# Patient Record
Sex: MALE | Race: WHITE | NOT HISPANIC OR LATINO | Employment: UNEMPLOYED | URBAN - NONMETROPOLITAN AREA
[De-identification: names, ages, dates, MRNs, and addresses within clinical notes are randomized per-mention and may not be internally consistent; named-entity substitution may affect disease eponyms.]

---

## 2021-08-05 ENCOUNTER — APPOINTMENT (OUTPATIENT)
Dept: CT IMAGING | Facility: HOSPITAL | Age: 51
End: 2021-08-05

## 2021-08-05 ENCOUNTER — HOSPITAL ENCOUNTER (EMERGENCY)
Facility: HOSPITAL | Age: 51
Discharge: HOME | End: 2021-08-05
Attending: STUDENT IN AN ORGANIZED HEALTH CARE EDUCATION/TRAINING PROGRAM | Admitting: STUDENT IN AN ORGANIZED HEALTH CARE EDUCATION/TRAINING PROGRAM

## 2021-08-05 ENCOUNTER — APPOINTMENT (OUTPATIENT)
Dept: GENERAL RADIOLOGY | Facility: HOSPITAL | Age: 51
End: 2021-08-05

## 2021-08-05 VITALS
TEMPERATURE: 98.2 F | HEART RATE: 114 BPM | HEIGHT: 71 IN | OXYGEN SATURATION: 95 % | BODY MASS INDEX: 21.56 KG/M2 | DIASTOLIC BLOOD PRESSURE: 98 MMHG | RESPIRATION RATE: 16 BRPM | SYSTOLIC BLOOD PRESSURE: 163 MMHG | WEIGHT: 154 LBS

## 2021-08-05 DIAGNOSIS — F10.10 ALCOHOL ABUSE: ICD-10-CM

## 2021-08-05 DIAGNOSIS — T14.8XXA AVULSION FRACTURE: Primary | ICD-10-CM

## 2021-08-05 DIAGNOSIS — S42.291A CLOSED FRACTURE OF HEAD OF RIGHT HUMERUS, INITIAL ENCOUNTER: ICD-10-CM

## 2021-08-05 DIAGNOSIS — R55 NEAR SYNCOPE: ICD-10-CM

## 2021-08-05 LAB
ALBUMIN SERPL-MCNC: 3.9 G/DL (ref 3.5–5.2)
ALBUMIN/GLOB SERPL: 1.3 G/DL
ALP SERPL-CCNC: 118 U/L (ref 39–117)
ALT SERPL W P-5'-P-CCNC: 99 U/L (ref 1–41)
ANION GAP SERPL CALCULATED.3IONS-SCNC: 17 MMOL/L (ref 5–15)
AST SERPL-CCNC: 177 U/L (ref 1–40)
BASOPHILS # BLD AUTO: 0.06 10*3/MM3 (ref 0–0.2)
BASOPHILS NFR BLD AUTO: 0.5 % (ref 0–1.5)
BILIRUB SERPL-MCNC: 1.8 MG/DL (ref 0–1.2)
BUN SERPL-MCNC: 5 MG/DL (ref 6–20)
BUN/CREAT SERPL: 7.7 (ref 7–25)
CALCIUM SPEC-SCNC: 9.9 MG/DL (ref 8.6–10.5)
CHLORIDE SERPL-SCNC: 87 MMOL/L (ref 98–107)
CO2 SERPL-SCNC: 26 MMOL/L (ref 22–29)
CREAT SERPL-MCNC: 0.65 MG/DL (ref 0.76–1.27)
DEPRECATED RDW RBC AUTO: 52.1 FL (ref 37–54)
EOSINOPHIL # BLD AUTO: 0.05 10*3/MM3 (ref 0–0.4)
EOSINOPHIL NFR BLD AUTO: 0.4 % (ref 0.3–6.2)
ERYTHROCYTE [DISTWIDTH] IN BLOOD BY AUTOMATED COUNT: 15 % (ref 12.3–15.4)
ETHANOL BLD-MCNC: 220 MG/DL (ref 0–10)
ETHANOL UR QL: 0.22 %
GFR SERPL CREATININE-BSD FRML MDRD: 131 ML/MIN/1.73
GLOBULIN UR ELPH-MCNC: 3 GM/DL
GLUCOSE SERPL-MCNC: 106 MG/DL (ref 65–99)
HCT VFR BLD AUTO: 47.6 % (ref 37.5–51)
HGB BLD-MCNC: 17 G/DL (ref 13–17.7)
HOLD SPECIMEN: NORMAL
IMM GRANULOCYTES # BLD AUTO: 0.08 10*3/MM3 (ref 0–0.05)
IMM GRANULOCYTES NFR BLD AUTO: 0.7 % (ref 0–0.5)
LYMPHOCYTES # BLD AUTO: 2.85 10*3/MM3 (ref 0.7–3.1)
LYMPHOCYTES NFR BLD AUTO: 23.4 % (ref 19.6–45.3)
MAGNESIUM SERPL-MCNC: 1.5 MG/DL (ref 1.6–2.6)
MCH RBC QN AUTO: 33.9 PG (ref 26.6–33)
MCHC RBC AUTO-ENTMCNC: 35.7 G/DL (ref 31.5–35.7)
MCV RBC AUTO: 94.8 FL (ref 79–97)
MONOCYTES # BLD AUTO: 1.24 10*3/MM3 (ref 0.1–0.9)
MONOCYTES NFR BLD AUTO: 10.2 % (ref 5–12)
NEUTROPHILS NFR BLD AUTO: 64.8 % (ref 42.7–76)
NEUTROPHILS NFR BLD AUTO: 7.89 10*3/MM3 (ref 1.7–7)
NRBC BLD AUTO-RTO: 0 /100 WBC (ref 0–0.2)
PLATELET # BLD AUTO: 76 10*3/MM3 (ref 140–450)
PMV BLD AUTO: 10.1 FL (ref 6–12)
POTASSIUM SERPL-SCNC: 2.9 MMOL/L (ref 3.5–5.2)
PROT SERPL-MCNC: 6.9 G/DL (ref 6–8.5)
RBC # BLD AUTO: 5.02 10*6/MM3 (ref 4.14–5.8)
SODIUM SERPL-SCNC: 130 MMOL/L (ref 136–145)
WBC # BLD AUTO: 12.17 10*3/MM3 (ref 3.4–10.8)

## 2021-08-05 PROCEDURE — 96368 THER/DIAG CONCURRENT INF: CPT

## 2021-08-05 PROCEDURE — 82077 ASSAY SPEC XCP UR&BREATH IA: CPT | Performed by: NURSE PRACTITIONER

## 2021-08-05 PROCEDURE — 99284 EMERGENCY DEPT VISIT MOD MDM: CPT

## 2021-08-05 PROCEDURE — 93010 ELECTROCARDIOGRAM REPORT: CPT | Performed by: INTERNAL MEDICINE

## 2021-08-05 PROCEDURE — 93005 ELECTROCARDIOGRAM TRACING: CPT | Performed by: NURSE PRACTITIONER

## 2021-08-05 PROCEDURE — 25010000002 MAGNESIUM SULFATE 2 GM/50ML SOLUTION: Performed by: NURSE PRACTITIONER

## 2021-08-05 PROCEDURE — 0 IOPAMIDOL PER 1 ML: Performed by: STUDENT IN AN ORGANIZED HEALTH CARE EDUCATION/TRAINING PROGRAM

## 2021-08-05 PROCEDURE — 70450 CT HEAD/BRAIN W/O DYE: CPT

## 2021-08-05 PROCEDURE — 96366 THER/PROPH/DIAG IV INF ADDON: CPT

## 2021-08-05 PROCEDURE — 25010000003 POTASSIUM CHLORIDE 10 MEQ/100ML SOLUTION: Performed by: NURSE PRACTITIONER

## 2021-08-05 PROCEDURE — 80053 COMPREHEN METABOLIC PANEL: CPT | Performed by: NURSE PRACTITIONER

## 2021-08-05 PROCEDURE — 70498 CT ANGIOGRAPHY NECK: CPT

## 2021-08-05 PROCEDURE — 85025 COMPLETE CBC W/AUTO DIFF WBC: CPT | Performed by: NURSE PRACTITIONER

## 2021-08-05 PROCEDURE — 73030 X-RAY EXAM OF SHOULDER: CPT

## 2021-08-05 PROCEDURE — 96365 THER/PROPH/DIAG IV INF INIT: CPT

## 2021-08-05 PROCEDURE — 83735 ASSAY OF MAGNESIUM: CPT | Performed by: NURSE PRACTITIONER

## 2021-08-05 RX ORDER — SODIUM CHLORIDE 0.9 % (FLUSH) 0.9 %
10 SYRINGE (ML) INJECTION AS NEEDED
Status: DISCONTINUED | OUTPATIENT
Start: 2021-08-05 | End: 2021-08-05 | Stop reason: HOSPADM

## 2021-08-05 RX ORDER — MAGNESIUM SULFATE HEPTAHYDRATE 40 MG/ML
2 INJECTION, SOLUTION INTRAVENOUS ONCE
Status: COMPLETED | OUTPATIENT
Start: 2021-08-05 | End: 2021-08-05

## 2021-08-05 RX ORDER — POTASSIUM CHLORIDE 7.45 MG/ML
10 INJECTION INTRAVENOUS ONCE
Status: COMPLETED | OUTPATIENT
Start: 2021-08-05 | End: 2021-08-05

## 2021-08-05 RX ADMIN — SODIUM CHLORIDE 1000 ML: 9 INJECTION, SOLUTION INTRAVENOUS at 18:58

## 2021-08-05 RX ADMIN — IOPAMIDOL 90 ML: 755 INJECTION, SOLUTION INTRAVENOUS at 16:50

## 2021-08-05 RX ADMIN — MAGNESIUM SULFATE HEPTAHYDRATE 2 G: 40 INJECTION, SOLUTION INTRAVENOUS at 15:50

## 2021-08-05 RX ADMIN — SODIUM CHLORIDE 1000 ML: 9 INJECTION, SOLUTION INTRAVENOUS at 13:25

## 2021-08-05 RX ADMIN — POTASSIUM CHLORIDE 10 MEQ: 7.46 INJECTION, SOLUTION INTRAVENOUS at 15:55

## 2021-08-05 NOTE — ED TRIAGE NOTES
Pt presents to ED via EMS with c/o possibly falling yesterday and injuring right shoulder and left ribs. Pt reports history of seizures and is unsure if he had seizure. Pt reports drinking alcohol today with last drink being around 0730 this AM.

## 2021-08-05 NOTE — ED PROVIDER NOTES
Subjective   50-year-old male in the emergency department secondary to multiple medical complaints.  Patient reports he has chronic alcoholism.  He fell today due to his syncopal episode.  He reports a history of seizures but he denies that this felt like a seizure.  Complaining of right shoulder pain as well.      History provided by:  Patient   used: No        Review of Systems   Constitutional: Negative for chills and fatigue.   HENT: Negative for congestion.    Respiratory: Negative for shortness of breath.    Cardiovascular: Negative for chest pain and palpitations.   Gastrointestinal: Negative for abdominal pain, diarrhea, nausea and vomiting.   Genitourinary: Negative for flank pain.   Musculoskeletal: Positive for arthralgias.   Skin: Negative for wound.   Allergic/Immunologic: Negative for immunocompromised state.   Neurological: Positive for syncope and weakness.   Hematological: Negative for adenopathy.   Psychiatric/Behavioral: Negative for confusion.   All other systems reviewed and are negative.      Past Medical History:   Diagnosis Date   • Alcohol abuse    • Hypertension        No Known Allergies    History reviewed. No pertinent surgical history.    History reviewed. No pertinent family history.    Social History     Socioeconomic History   • Marital status: Single     Spouse name: Not on file   • Number of children: Not on file   • Years of education: Not on file   • Highest education level: Not on file   Tobacco Use   • Smoking status: Current Every Day Smoker     Packs/day: 1.50     Types: Cigarettes   • Smokeless tobacco: Never Used   Vaping Use   • Vaping Use: Never used   Substance and Sexual Activity   • Alcohol use: Yes     Comment: pint and 6 pack daily   • Drug use: Not Currently           Objective   Physical Exam  Vitals and nursing note reviewed.   Constitutional:       Appearance: He is well-developed.   HENT:      Head: Normocephalic.      Nose: Nose normal.    Eyes:      Conjunctiva/sclera: Conjunctivae normal.      Pupils: Pupils are equal, round, and reactive to light.   Cardiovascular:      Rate and Rhythm: Normal rate and regular rhythm.      Heart sounds: Normal heart sounds.   Pulmonary:      Effort: Pulmonary effort is normal.      Breath sounds: Normal breath sounds.   Abdominal:      Palpations: Abdomen is soft.   Musculoskeletal:         General: Normal range of motion.      Cervical back: Normal range of motion.   Skin:     General: Skin is warm and dry.   Neurological:      Mental Status: He is alert and oriented to person, place, and time.      GCS: GCS eye subscore is 4. GCS verbal subscore is 5. GCS motor subscore is 6.         Procedures           ED Course      CT Angiogram Carotids   Final Result   No significant carotid stenosis.   Mild right maxillary sinus disease.      Electronically signed by:  Facundo Jenkins MD  8/5/2021 5:59 PM CDT   Workstation: [id number]      CT Head Without Contrast   Final Result   Vascular calcifications in the internal carotid arteries right   greater than left bilaterally in their upper intracavernous   portion.   Findings on the right suggests possible abnormal dilatation or   aneurysmal dilatation of the right internal carotid artery in the   upper intracavernous region. Further evaluation with CT   angiography would be best means of further clarify this finding.      Moderate diffuse atrophy with no acute ischemic change or   intracranial bleed.      Findings and recommendations personally discussed with the OMAR Gale at the time of this dictation at 3:59 PM CDT.      [id number]      Electronically signed by:  Yoni Luz MD  8/5/2021 4:00 PM   CDT Workstation: [id number]      XR Shoulder 2+ View Right   Final Result   Addendum 1 of 1    ADDENDUM    ADDENDUM #1          Addendum: Referring clinician is aware of findings.      Electronically signed by:  Dave Garrison MD  8/5/2021 1:31 PM CDT   Workstation:  [id number]         Final   1.  Along the superior aspect of the right humeral head there is   an irregular shaped bony fragment which measures 1.33 x 1.14 cm.   The donor site for this fragment appears to arise from the right   humeral head superior laterally. This is most consistent with an   avulsion fracture in this region.    2.  Remainder right shoulder exam is unremarkable.      Electronically signed by:  Dave Garrison MD  8/5/2021 1:12 PM CDT   Workstation: [id number]                                               MDM  Number of Diagnoses or Management Options  Alcohol abuse: new and requires workup  Avulsion fracture: new and requires workup  Closed fracture of head of right humerus, initial encounter: new and requires workup  Near syncope: new and requires workup  Diagnosis management comments: 50-year-old male in the emergency department, see original HPI for details.  Patient reports an alcohol abuse and drinking daily.  Reports he had a fall and has some right shoulder pain.  Likely some type of near syncopal episode.  Reports he has a history of seizures but his last one was greater than a year ago.  Patient placed in sling due to avulsion fracture of the humeral head.  Follow-up with Ortho.  Return emergency department if needed       Amount and/or Complexity of Data Reviewed  Clinical lab tests: ordered and reviewed  Tests in the radiology section of CPT®: ordered and reviewed    Risk of Complications, Morbidity, and/or Mortality  Presenting problems: moderate  Diagnostic procedures: moderate  Management options: moderate    Patient Progress  Patient progress: stable      Final diagnoses:   Avulsion fracture   Closed fracture of head of right humerus, initial encounter   Near syncope   Alcohol abuse       ED Disposition  ED Disposition     ED Disposition Condition Comment    Discharge Stable           Daniela Cage, APRN  [address] [place] [address] [place] [id number]  [phone]    Call in 3  days           Medication List      No changes were made to your prescriptions during this visit.          Robe Koo, APRN  08/05/21 1259

## 2021-08-08 ENCOUNTER — APPOINTMENT (OUTPATIENT)
Dept: GENERAL RADIOLOGY | Facility: HOSPITAL | Age: 51
End: 2021-08-08

## 2021-08-08 ENCOUNTER — HOSPITAL ENCOUNTER (OUTPATIENT)
Facility: HOSPITAL | Age: 51
Setting detail: OBSERVATION
Discharge: HOME | End: 2021-08-12
Attending: EMERGENCY MEDICINE | Admitting: INTERNAL MEDICINE

## 2021-08-08 ENCOUNTER — APPOINTMENT (OUTPATIENT)
Dept: CT IMAGING | Facility: HOSPITAL | Age: 51
End: 2021-08-08

## 2021-08-08 DIAGNOSIS — R07.9 CHEST PAIN, UNSPECIFIED TYPE: Primary | ICD-10-CM

## 2021-08-08 DIAGNOSIS — Z78.9 IMPAIRED MOBILITY AND ADLS: ICD-10-CM

## 2021-08-08 DIAGNOSIS — S42.251A CLOSED DISPLACED FRACTURE OF GREATER TUBEROSITY OF RIGHT HUMERUS, INITIAL ENCOUNTER: ICD-10-CM

## 2021-08-08 DIAGNOSIS — I16.0 HYPERTENSIVE URGENCY: ICD-10-CM

## 2021-08-08 DIAGNOSIS — Z74.09 IMPAIRED MOBILITY AND ADLS: ICD-10-CM

## 2021-08-08 DIAGNOSIS — Z74.09 IMPAIRED FUNCTIONAL MOBILITY, BALANCE, GAIT, AND ENDURANCE: ICD-10-CM

## 2021-08-08 DIAGNOSIS — E87.6 HYPOKALEMIA: ICD-10-CM

## 2021-08-08 LAB
ALBUMIN SERPL-MCNC: 4.1 G/DL (ref 3.5–5.2)
ALBUMIN/GLOB SERPL: 1.2 G/DL
ALP SERPL-CCNC: 130 U/L (ref 39–117)
ALT SERPL W P-5'-P-CCNC: 148 U/L (ref 1–41)
AMPHET+METHAMPHET UR QL: NEGATIVE
AMPHETAMINES UR QL: NEGATIVE
ANION GAP SERPL CALCULATED.3IONS-SCNC: 15 MMOL/L (ref 5–15)
APTT PPP: 29.7 SECONDS (ref 20–40.3)
AST SERPL-CCNC: 247 U/L (ref 1–40)
BARBITURATES UR QL SCN: NEGATIVE
BASOPHILS # BLD AUTO: 0.07 10*3/MM3 (ref 0–0.2)
BASOPHILS NFR BLD AUTO: 0.9 % (ref 0–1.5)
BENZODIAZ UR QL SCN: NEGATIVE
BILIRUB SERPL-MCNC: 2 MG/DL (ref 0–1.2)
BUN SERPL-MCNC: 7 MG/DL (ref 6–20)
BUN/CREAT SERPL: 12.1 (ref 7–25)
BUPRENORPHINE SERPL-MCNC: NEGATIVE NG/ML
CALCIUM SPEC-SCNC: 10.3 MG/DL (ref 8.6–10.5)
CANNABINOIDS SERPL QL: NEGATIVE
CHLORIDE SERPL-SCNC: 90 MMOL/L (ref 98–107)
CO2 SERPL-SCNC: 28 MMOL/L (ref 22–29)
COCAINE UR QL: NEGATIVE
CREAT SERPL-MCNC: 0.58 MG/DL (ref 0.76–1.27)
D-DIMER, QUANTITATIVE (MAD,POW, STR): 1400 NG/ML (FEU) (ref 0–470)
DEPRECATED RDW RBC AUTO: 55.5 FL (ref 37–54)
EOSINOPHIL # BLD AUTO: 0.02 10*3/MM3 (ref 0–0.4)
EOSINOPHIL NFR BLD AUTO: 0.2 % (ref 0.3–6.2)
ERYTHROCYTE [DISTWIDTH] IN BLOOD BY AUTOMATED COUNT: 15.4 % (ref 12.3–15.4)
ETHANOL BLD-MCNC: <10 MG/DL (ref 0–10)
ETHANOL UR QL: <0.01 %
FLUAV RNA RESP QL NAA+PROBE: NOT DETECTED
FLUBV RNA RESP QL NAA+PROBE: NOT DETECTED
GFR SERPL CREATININE-BSD FRML MDRD: 150 ML/MIN/1.73
GLOBULIN UR ELPH-MCNC: 3.3 GM/DL
GLUCOSE SERPL-MCNC: 129 MG/DL (ref 65–99)
HCT VFR BLD AUTO: 44.3 % (ref 37.5–51)
HGB BLD-MCNC: 15.5 G/DL (ref 13–17.7)
HOLD SPECIMEN: NORMAL
IMM GRANULOCYTES # BLD AUTO: 0.03 10*3/MM3 (ref 0–0.05)
IMM GRANULOCYTES NFR BLD AUTO: 0.4 % (ref 0–0.5)
INR PPP: 0.94 (ref 0.8–1.2)
LYMPHOCYTES # BLD AUTO: 1.24 10*3/MM3 (ref 0.7–3.1)
LYMPHOCYTES NFR BLD AUTO: 15.2 % (ref 19.6–45.3)
MAGNESIUM SERPL-MCNC: 1 MG/DL (ref 1.6–2.6)
MCH RBC QN AUTO: 33.8 PG (ref 26.6–33)
MCHC RBC AUTO-ENTMCNC: 35 G/DL (ref 31.5–35.7)
MCV RBC AUTO: 96.7 FL (ref 79–97)
METHADONE UR QL SCN: NEGATIVE
MONOCYTES # BLD AUTO: 1.25 10*3/MM3 (ref 0.1–0.9)
MONOCYTES NFR BLD AUTO: 15.3 % (ref 5–12)
NEUTROPHILS NFR BLD AUTO: 5.55 10*3/MM3 (ref 1.7–7)
NEUTROPHILS NFR BLD AUTO: 68 % (ref 42.7–76)
NRBC BLD AUTO-RTO: 0 /100 WBC (ref 0–0.2)
NT-PROBNP SERPL-MCNC: 175.1 PG/ML (ref 0–450)
OPIATES UR QL: NEGATIVE
OXYCODONE UR QL SCN: NEGATIVE
PCP UR QL SCN: NEGATIVE
PLATELET # BLD AUTO: 93 10*3/MM3 (ref 140–450)
PMV BLD AUTO: 9.7 FL (ref 6–12)
POTASSIUM SERPL-SCNC: 2.7 MMOL/L (ref 3.5–5.2)
POTASSIUM SERPL-SCNC: 2.8 MMOL/L (ref 3.5–5.2)
PROPOXYPH UR QL: NEGATIVE
PROT SERPL-MCNC: 7.4 G/DL (ref 6–8.5)
PROTHROMBIN TIME: 12.5 SECONDS (ref 11.1–15.3)
RBC # BLD AUTO: 4.58 10*6/MM3 (ref 4.14–5.8)
SARS-COV-2 RNA RESP QL NAA+PROBE: NOT DETECTED
SODIUM SERPL-SCNC: 133 MMOL/L (ref 136–145)
TRICYCLICS UR QL SCN: NEGATIVE
TROPONIN T SERPL-MCNC: <0.01 NG/ML (ref 0–0.03)
WBC # BLD AUTO: 8.16 10*3/MM3 (ref 3.4–10.8)

## 2021-08-08 PROCEDURE — 93005 ELECTROCARDIOGRAM TRACING: CPT | Performed by: EMERGENCY MEDICINE

## 2021-08-08 PROCEDURE — 96361 HYDRATE IV INFUSION ADD-ON: CPT

## 2021-08-08 PROCEDURE — 73060 X-RAY EXAM OF HUMERUS: CPT

## 2021-08-08 PROCEDURE — G0378 HOSPITAL OBSERVATION PER HR: HCPCS

## 2021-08-08 PROCEDURE — 25010000002 LORAZEPAM PER 2 MG: Performed by: EMERGENCY MEDICINE

## 2021-08-08 PROCEDURE — 85730 THROMBOPLASTIN TIME PARTIAL: CPT | Performed by: EMERGENCY MEDICINE

## 2021-08-08 PROCEDURE — 96365 THER/PROPH/DIAG IV INF INIT: CPT

## 2021-08-08 PROCEDURE — 82077 ASSAY SPEC XCP UR&BREATH IA: CPT | Performed by: EMERGENCY MEDICINE

## 2021-08-08 PROCEDURE — 83880 ASSAY OF NATRIURETIC PEPTIDE: CPT | Performed by: EMERGENCY MEDICINE

## 2021-08-08 PROCEDURE — 83735 ASSAY OF MAGNESIUM: CPT | Performed by: EMERGENCY MEDICINE

## 2021-08-08 PROCEDURE — 80053 COMPREHEN METABOLIC PANEL: CPT | Performed by: EMERGENCY MEDICINE

## 2021-08-08 PROCEDURE — 87636 SARSCOV2 & INF A&B AMP PRB: CPT | Performed by: EMERGENCY MEDICINE

## 2021-08-08 PROCEDURE — 96372 THER/PROPH/DIAG INJ SC/IM: CPT

## 2021-08-08 PROCEDURE — 71045 X-RAY EXAM CHEST 1 VIEW: CPT

## 2021-08-08 PROCEDURE — 80306 DRUG TEST PRSMV INSTRMNT: CPT | Performed by: EMERGENCY MEDICINE

## 2021-08-08 PROCEDURE — 85379 FIBRIN DEGRADATION QUANT: CPT | Performed by: EMERGENCY MEDICINE

## 2021-08-08 PROCEDURE — 85610 PROTHROMBIN TIME: CPT | Performed by: EMERGENCY MEDICINE

## 2021-08-08 PROCEDURE — 93010 ELECTROCARDIOGRAM REPORT: CPT | Performed by: INTERNAL MEDICINE

## 2021-08-08 PROCEDURE — 25010000002 THIAMINE PER 100 MG: Performed by: EMERGENCY MEDICINE

## 2021-08-08 PROCEDURE — 85025 COMPLETE CBC W/AUTO DIFF WBC: CPT | Performed by: EMERGENCY MEDICINE

## 2021-08-08 PROCEDURE — 84132 ASSAY OF SERUM POTASSIUM: CPT | Performed by: INTERNAL MEDICINE

## 2021-08-08 PROCEDURE — 0 IOPAMIDOL PER 1 ML: Performed by: EMERGENCY MEDICINE

## 2021-08-08 PROCEDURE — 25010000002 HEPARIN (PORCINE) PER 1000 UNITS: Performed by: INTERNAL MEDICINE

## 2021-08-08 PROCEDURE — 84484 ASSAY OF TROPONIN QUANT: CPT | Performed by: INTERNAL MEDICINE

## 2021-08-08 PROCEDURE — C9803 HOPD COVID-19 SPEC COLLECT: HCPCS

## 2021-08-08 PROCEDURE — 71275 CT ANGIOGRAPHY CHEST: CPT

## 2021-08-08 PROCEDURE — 96376 TX/PRO/DX INJ SAME DRUG ADON: CPT

## 2021-08-08 PROCEDURE — 96375 TX/PRO/DX INJ NEW DRUG ADDON: CPT

## 2021-08-08 PROCEDURE — 84484 ASSAY OF TROPONIN QUANT: CPT | Performed by: EMERGENCY MEDICINE

## 2021-08-08 PROCEDURE — 73030 X-RAY EXAM OF SHOULDER: CPT

## 2021-08-08 PROCEDURE — 99285 EMERGENCY DEPT VISIT HI MDM: CPT

## 2021-08-08 RX ORDER — ASPIRIN 81 MG/1
81 TABLET ORAL DAILY
Status: DISCONTINUED | OUTPATIENT
Start: 2021-08-09 | End: 2021-08-11

## 2021-08-08 RX ORDER — POTASSIUM CHLORIDE 750 MG/1
40 CAPSULE, EXTENDED RELEASE ORAL AS NEEDED
Status: DISCONTINUED | OUTPATIENT
Start: 2021-08-08 | End: 2021-08-12 | Stop reason: HOSPADM

## 2021-08-08 RX ORDER — SODIUM CHLORIDE 0.9 % (FLUSH) 0.9 %
10 SYRINGE (ML) INJECTION EVERY 12 HOURS SCHEDULED
Status: DISCONTINUED | OUTPATIENT
Start: 2021-08-08 | End: 2021-08-11

## 2021-08-08 RX ORDER — POTASSIUM CHLORIDE 7.45 MG/ML
10 INJECTION INTRAVENOUS
Status: DISCONTINUED | OUTPATIENT
Start: 2021-08-08 | End: 2021-08-12 | Stop reason: HOSPADM

## 2021-08-08 RX ORDER — MAGNESIUM SULFATE HEPTAHYDRATE 40 MG/ML
2 INJECTION, SOLUTION INTRAVENOUS AS NEEDED
Status: DISCONTINUED | OUTPATIENT
Start: 2021-08-08 | End: 2021-08-12 | Stop reason: HOSPADM

## 2021-08-08 RX ORDER — SODIUM CHLORIDE 9 MG/ML
125 INJECTION, SOLUTION INTRAVENOUS CONTINUOUS
Status: DISCONTINUED | OUTPATIENT
Start: 2021-08-08 | End: 2021-08-11

## 2021-08-08 RX ORDER — LORAZEPAM 1 MG/1
1 TABLET ORAL
Status: DISCONTINUED | OUTPATIENT
Start: 2021-08-08 | End: 2021-08-11

## 2021-08-08 RX ORDER — LABETALOL HYDROCHLORIDE 5 MG/ML
20 INJECTION, SOLUTION INTRAVENOUS ONCE
Status: COMPLETED | OUTPATIENT
Start: 2021-08-08 | End: 2021-08-08

## 2021-08-08 RX ORDER — LORAZEPAM 2 MG/ML
2 INJECTION INTRAMUSCULAR ONCE
Status: COMPLETED | OUTPATIENT
Start: 2021-08-08 | End: 2021-08-08

## 2021-08-08 RX ORDER — LABETALOL HYDROCHLORIDE 5 MG/ML
40 INJECTION, SOLUTION INTRAVENOUS ONCE
Status: COMPLETED | OUTPATIENT
Start: 2021-08-08 | End: 2021-08-08

## 2021-08-08 RX ORDER — SODIUM CHLORIDE 0.9 % (FLUSH) 0.9 %
10 SYRINGE (ML) INJECTION AS NEEDED
Status: DISCONTINUED | OUTPATIENT
Start: 2021-08-08 | End: 2021-08-12 | Stop reason: HOSPADM

## 2021-08-08 RX ORDER — MAGNESIUM SULFATE HEPTAHYDRATE 40 MG/ML
4 INJECTION, SOLUTION INTRAVENOUS AS NEEDED
Status: DISCONTINUED | OUTPATIENT
Start: 2021-08-08 | End: 2021-08-12 | Stop reason: HOSPADM

## 2021-08-08 RX ORDER — HYDROCODONE BITARTRATE AND ACETAMINOPHEN 5; 325 MG/1; MG/1
1 TABLET ORAL ONCE
Status: COMPLETED | OUTPATIENT
Start: 2021-08-08 | End: 2021-08-08

## 2021-08-08 RX ORDER — HEPARIN SODIUM 5000 [USP'U]/ML
5000 INJECTION, SOLUTION INTRAVENOUS; SUBCUTANEOUS EVERY 12 HOURS SCHEDULED
Status: DISCONTINUED | OUTPATIENT
Start: 2021-08-08 | End: 2021-08-11

## 2021-08-08 RX ORDER — LORAZEPAM 2 MG/1
2 TABLET ORAL
Status: DISCONTINUED | OUTPATIENT
Start: 2021-08-08 | End: 2021-08-12 | Stop reason: HOSPADM

## 2021-08-08 RX ORDER — NITROGLYCERIN 0.4 MG/1
0.4 TABLET SUBLINGUAL
Status: DISCONTINUED | OUTPATIENT
Start: 2021-08-08 | End: 2021-08-12 | Stop reason: HOSPADM

## 2021-08-08 RX ORDER — ASPIRIN 81 MG/1
324 TABLET, CHEWABLE ORAL ONCE
Status: COMPLETED | OUTPATIENT
Start: 2021-08-08 | End: 2021-08-08

## 2021-08-08 RX ORDER — POTASSIUM CHLORIDE 1.5 G/1.77G
40 POWDER, FOR SOLUTION ORAL AS NEEDED
Status: DISCONTINUED | OUTPATIENT
Start: 2021-08-08 | End: 2021-08-12 | Stop reason: HOSPADM

## 2021-08-08 RX ORDER — LORAZEPAM 2 MG/ML
2 INJECTION INTRAMUSCULAR
Status: DISCONTINUED | OUTPATIENT
Start: 2021-08-08 | End: 2021-08-12 | Stop reason: HOSPADM

## 2021-08-08 RX ORDER — POTASSIUM CHLORIDE 750 MG/1
40 CAPSULE, EXTENDED RELEASE ORAL ONCE
Status: COMPLETED | OUTPATIENT
Start: 2021-08-08 | End: 2021-08-08

## 2021-08-08 RX ORDER — LABETALOL HYDROCHLORIDE 5 MG/ML
20 INJECTION, SOLUTION INTRAVENOUS ONCE
Status: DISCONTINUED | OUTPATIENT
Start: 2021-08-08 | End: 2021-08-10

## 2021-08-08 RX ORDER — LORAZEPAM 2 MG/ML
1 INJECTION INTRAMUSCULAR
Status: DISCONTINUED | OUTPATIENT
Start: 2021-08-08 | End: 2021-08-12 | Stop reason: HOSPADM

## 2021-08-08 RX ADMIN — POTASSIUM CHLORIDE 40 MEQ: 750 CAPSULE, EXTENDED RELEASE ORAL at 18:11

## 2021-08-08 RX ADMIN — SODIUM CHLORIDE 125 ML/HR: 9 INJECTION, SOLUTION INTRAVENOUS at 22:32

## 2021-08-08 RX ADMIN — IOPAMIDOL 68 ML: 755 INJECTION, SOLUTION INTRAVENOUS at 13:59

## 2021-08-08 RX ADMIN — POTASSIUM CHLORIDE 40 MEQ: 750 CAPSULE, EXTENDED RELEASE ORAL at 14:47

## 2021-08-08 RX ADMIN — LABETALOL HYDROCHLORIDE 20 MG: 5 INJECTION, SOLUTION INTRAVENOUS at 14:12

## 2021-08-08 RX ADMIN — HEPARIN SODIUM 5000 UNITS: 5000 INJECTION INTRAVENOUS; SUBCUTANEOUS at 20:54

## 2021-08-08 RX ADMIN — POTASSIUM CHLORIDE 40 MEQ: 750 CAPSULE, EXTENDED RELEASE ORAL at 22:32

## 2021-08-08 RX ADMIN — HYDROCODONE BITARTRATE AND ACETAMINOPHEN 1 TABLET: 5; 325 TABLET ORAL at 14:12

## 2021-08-08 RX ADMIN — SODIUM CHLORIDE 125 ML/HR: 9 INJECTION, SOLUTION INTRAVENOUS at 13:32

## 2021-08-08 RX ADMIN — THIAMINE HYDROCHLORIDE 1000 ML/HR: 100 INJECTION, SOLUTION INTRAMUSCULAR; INTRAVENOUS at 13:28

## 2021-08-08 RX ADMIN — LORAZEPAM 2 MG: 2 INJECTION INTRAMUSCULAR; INTRAVENOUS at 14:58

## 2021-08-08 RX ADMIN — LABETALOL HYDROCHLORIDE 20 MG: 5 INJECTION, SOLUTION INTRAVENOUS at 14:58

## 2021-08-08 RX ADMIN — ASPIRIN 324 MG: 81 TABLET, CHEWABLE ORAL at 13:11

## 2021-08-08 NOTE — ED PROVIDER NOTES
Subjective   51yo male pmh significant ethanol abuse/htn presents ED via EMS c/o mechanical ground level fall from tripping over chair landing on right shoulder.  Pt seen recently 08.05.2021 secondary to fall with known right humeral head avulsion fracture.  Pt reports subsequent onset anterior precordial chest pain unable to characterize associated with soa.      History provided by:  Patient  Illness  Severity:  Moderate  Onset quality:  Sudden  Chronicity:  New  Associated symptoms: chest pain and shortness of breath    Associated symptoms: no cough        Review of Systems   Constitutional: Negative.    HENT: Negative.    Eyes: Negative for redness.   Respiratory: Positive for shortness of breath. Negative for cough.    Cardiovascular: Positive for chest pain.   Genitourinary: Negative.    Musculoskeletal: Positive for arthralgias.   Skin: Negative.    Neurological: Negative for syncope.   All other systems reviewed and are negative.      Past Medical History:   Diagnosis Date   • Alcohol abuse    • Hypertension        No Known Allergies    History reviewed. No pertinent surgical history.    History reviewed. No pertinent family history.    Social History     Socioeconomic History   • Marital status: Single     Spouse name: Not on file   • Number of children: Not on file   • Years of education: Not on file   • Highest education level: Not on file   Tobacco Use   • Smoking status: Current Every Day Smoker     Packs/day: 1.50     Types: Cigarettes   • Smokeless tobacco: Never Used   Vaping Use   • Vaping Use: Never used   Substance and Sexual Activity   • Alcohol use: Yes     Comment: pint and 6 pack daily   • Drug use: Not Currently           Objective   Physical Exam  Vitals and nursing note reviewed.   Constitutional:       Appearance: Normal appearance.   HENT:      Head: Normocephalic and atraumatic.      Right Ear: External ear normal.      Left Ear: External ear normal.      Nose: Nose normal.       Mouth/Throat:      Mouth: Mucous membranes are moist.   Eyes:      Pupils: Pupils are equal, round, and reactive to light.   Cardiovascular:      Rate and Rhythm: Normal rate and regular rhythm.      Pulses: Normal pulses.      Heart sounds: Normal heart sounds. No murmur heard.   No friction rub. No gallop.    Pulmonary:      Effort: Pulmonary effort is normal. No respiratory distress.      Breath sounds: Normal breath sounds. No wheezing, rhonchi or rales.   Chest:      Chest wall: No tenderness.   Abdominal:      General: Abdomen is flat. Bowel sounds are normal. There is no distension.      Palpations: Abdomen is soft.      Tenderness: There is no abdominal tenderness. There is no guarding or rebound.   Musculoskeletal:      Right shoulder: Tenderness and bony tenderness present. No swelling or deformity. Decreased range of motion.        Arms:       Cervical back: Normal range of motion and neck supple. No rigidity or tenderness.   Lymphadenopathy:      Cervical: No cervical adenopathy.   Skin:     General: Skin is warm and dry.   Neurological:      General: No focal deficit present.      Mental Status: He is alert and oriented to person, place, and time.      GCS: GCS eye subscore is 4. GCS verbal subscore is 5. GCS motor subscore is 6.         ECG 12 Lead      Date/Time: 8/8/2021 1:32 PM  Performed by: Michele Casper MD  Authorized by: Michele Casper MD   Interpreted by physician  Rhythm: sinus tachycardia  Rate: tachycardic  BPM: 101  QRS axis: left  Conduction: conduction normal  ST Segments: ST segments normal  Other findings: PRWP  Q waves: V1 and V2  Clinical impression: abnormal ECG                 ED Course      Labs Reviewed   COMPREHENSIVE METABOLIC PANEL - Abnormal; Notable for the following components:       Result Value    Glucose 129 (*)     Creatinine 0.58 (*)     Sodium 133 (*)     Potassium 2.7 (*)     Chloride 90 (*)     ALT (SGPT) 148 (*)     AST (SGOT) 247 (*)     Alkaline Phosphatase 130  (*)     Total Bilirubin 2.0 (*)     All other components within normal limits    Narrative:     GFR Normal >60  Chronic Kidney Disease <60  Kidney Failure <15     D-DIMER, QUANTITATIVE - Abnormal; Notable for the following components:    D-Dimer, Quantitative 1,400 (*)     All other components within normal limits    Narrative:     Dimer values <500 ng/ml FEU are FDA approved as aid in diagnosis of deep venous thrombosis and pulmonary embolism.  This test should not be used in an exclusion strategy with pretest probability alone.    A recent guideline regarding diagnosis for pulmonary thromboembolism recommends an adjusted exclusion criterion of age x 10 ng/ml FEU for patients >50 years of age ([employer] Intern Med 2015; 163: 701-711).     CBC WITH AUTO DIFFERENTIAL - Abnormal; Notable for the following components:    MCH 33.8 (*)     RDW-SD 55.5 (*)     Platelets 93 (*)     Lymphocyte % 15.2 (*)     Monocyte % 15.3 (*)     Eosinophil % 0.2 (*)     Monocytes, Absolute 1.25 (*)     All other components within normal limits   COVID-19 AND FLU A/B, NP SWAB IN TRANSPORT MEDIA 8-12 HR TAT - Normal    Narrative:     Fact sheet for providers: https://www.fda.gov/media/441253/download    Fact sheet for patients: https://www.fda.gov/media/216546/download    Test performed by PCR.   PROTIME-INR - Normal    Narrative:     Therapeutic range for most indications is 2.0-3.0 INR,  or 2.5-3.5 for mechanical heart valves.   APTT - Normal    Narrative:     The recommended Heparin therapeutic range is 68-97 seconds.   TROPONIN (IN-HOUSE) - Normal    Narrative:     Troponin T Reference Range:  <= 0.03 ng/mL-   Negative for AMI  >0.03 ng/mL-     Abnormal for myocardial necrosis.  Clinicians would have to utilize clinical acumen, EKG, Troponin and serial changes to determine if it is an Acute Myocardial Infarction or myocardial injury due to an underlying chronic condition.       Results may be falsely decreased if patient taking Biotin.      BNP (IN-HOUSE) - Normal    Narrative:     Among patients with dyspnea, NT-proBNP is highly sensitive for the detection of acute congestive heart failure. In addition NT-proBNP of <300 pg/ml effectively rules out acute congestive heart failure with 99% negative predictive value.    Results may be falsely decreased if patient taking Biotin.     ETHANOL   TROPONIN (IN-HOUSE)   URINE DRUG SCREEN   CBC AND DIFFERENTIAL    Narrative:     The following orders were created for panel order CBC & Differential.  Procedure                               Abnormality         Status                     ---------                               -----------         ------                     CBC Auto Differential[[id number]]        Abnormal            Final result                 Please view results for these tests on the individual orders.     XR Shoulder 2+ View Right    Result Date: 8/8/2021  Narrative: Three view right shoulder HISTORY: Shoulder pain. Fell. AP films with the humerus in internal and external rotation and scapular Y view were obtained. COMPARISON: August 5, 2021 FINDINGS: Previously demonstrated displaced avulsion fracture lateral aspect of the humeral head. Hypertrophic change acromioclavicular joint. No fracture or dislocation. No other osseous or articular abnormality.     Impression: CONCLUSION: Previously demonstrated displaced avulsion fracture lateral aspect of the humeral head. Hypertrophic change acromioclavicular joint. [id number] Electronically signed by:  Danial Mckinney MD  8/8/2021 1:07 PM CDT Workstation: [id number]    XR Shoulder 2+ View Right    Addendum Date: 8/5/2021 Addendum:    ADDENDUM ADDENDUM #1 Addendum: Referring clinician is aware of findings. Electronically signed by:  Dave Garrison MD  8/5/2021 1:31 PM CDT Workstation: [id number]     Result Date: 8/5/2021  Narrative: PROCEDURE:  Right  Shoulder 4  views. REASON FOR EXAM:  fall FINDINGS: Along the superior aspect of the right humeral head  there is an irregular shaped bony fragment which measures 1.33 x 1.14 cm. The donor site for this fragment appears to arise from the right humeral head superior laterally. This is most consistent with an avulsion fracture in this region. Otherwise bony and soft tissue structures of the right shoulder unremarkable.     Impression: 1.  Along the superior aspect of the right humeral head there is an irregular shaped bony fragment which measures 1.33 x 1.14 cm. The donor site for this fragment appears to arise from the right humeral head superior laterally. This is most consistent with an avulsion fracture in this region. 2.  Remainder right shoulder exam is unremarkable. Electronically signed by:  Dave Garrison MD  8/5/2021 1:12 PM CDT Workstation: [id number]    XR Humerus Right    Result Date: 8/8/2021  Narrative: Two view right humerus HISTORY: Arm pain. Fell. AP and lateral views obtained. COMPARISON: Right shoulder August 5, 2021. FINDINGS: Previously demonstrated displaced avulsion fracture lateral aspect of the humeral head. No dislocation. No other osseous or articular abnormality.     Impression: CONCLUSION: Previously demonstrated displaced avulsion fracture lateral aspect of the humeral head. [id number] Electronically signed by:  Danial Mckinney MD  8/8/2021 1:08 PM CDT Workstation: [email or web address])    CT Head Without Contrast    Result Date: 8/5/2021  Narrative: PROCEDURE: CT head without contrast. INDICATION: Syncope versus seizure. COMPARISON: None. Total DLP 1046.4 mGy-cm. TECHNIQUE: This exam was performed according to our departmental dose-optimization program, which includes automated exposure control, adjustment of the mA and/or kV according to patient size and/or use of iterative reconstruction technique. CT head without contrast performed with axial and reconstructed sagittal and coronal images. FINDINGS: Bony calvarium intact. 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 The ethmoid and sphenoid and  visualized portion left maxillary sinus clear. Mild mucosal thickening right maxillary sinus. Mastoid air cells are clear. There are vascular calcifications bilaterally in the internal carotid arteries in the upper intracavernous portion right greater than left. Calcifications on the right internal carotid in the upper intracavernous area suggests there may be dilatation of the internal carotid in this area. Cannot exclude either asymmetric plaque versus abnormal aneurysmal dilatation. Further evaluation with CT angiography would be helpful in further evaluating this area. No abnormal extra-axial fluid collections. No intracranial bleed. No mass effect or midline shift. No abnormal focal areas of hypodensity or acute ischemic change. Moderate diffuse atrophy with symmetrical prominence of the sulci, sylvian fissures and ventricles.     Impression: Vascular calcifications in the internal carotid arteries right greater than left bilaterally in their upper intracavernous portion. Findings on the right suggests possible abnormal dilatation or aneurysmal dilatation of the right internal carotid artery in the upper intracavernous region. Further evaluation with CT angiography would be best means of further clarify this finding. Moderate diffuse atrophy with no acute ischemic change or intracranial bleed. Findings and recommendations personally discussed with the OMAR Gale at the time of this dictation at 3:59 PM CDT. [id number] Electronically signed by:  Yoni Luz MD  8/5/2021 4:00 PM CDT Workstation: [id number]    XR Chest 1 View    Result Date: 8/8/2021  Narrative: PORTABLE CHEST HISTORY: Chest pain Portable AP film of the chest was obtained at 12:57 PM. COMPARISON: None FINDINGS: EKG leads. The lungs are clear of an acute process. The heart is not enlarged. The pulmonary vasculature is not increased. No pleural effusion. No pneumothorax. No acute osseous abnormality. Previously demonstrated displaced  avulsion fracture lateral aspect of the right humeral head. Old left rib fractures. Calcific tendinitis or bursitis left shoulder. Old fracture left clavicle.     Impression: CONCLUSION: No acute cardiopulmonary disease. Previously demonstrated displaced avulsion fracture lateral aspect of the right humeral head. Old left rib fractures. Calcific tendinitis or bursitis left shoulder. Old fracture left clavicle. [id number] Electronically signed by:  Danial Mckinney MD  8/8/2021 1:11 PM CDT Workstation: [id number]    CT Angiogram Carotids    Result Date: 8/5/2021  Narrative: PROCEDURE: CT NECK ANGIOGRAPHY WITH IV CONTRAST CLINICAL HISTORY: see ct head results. COMPARISON: CT head without contrast performed the same date. TECHNIQUE: CT angiography of the carotid regions was performed with intravenous contrast on the level of the aortic arch to the skull base in orthogonal planes, along with 3D reconstruction of the arterial vasculature of the head and neck from the source images. Measurement of carotid stenosis is based on NASCET criteria which calculates the percentage of stenosis relative to the luminal diameter of the normal carotid artery distal to the stenosis. CONTRAST: 90 mL IV Isovue 370 This exam was performed using radiation doses that are as low as reasonably achievable (ALARA). This exam was performed according to our departmental dose optimization program, which includes automated exposure control, adjustment of the mA and/or KV according to patient size and/or use of iterative reconstruction technique. FINDINGS: There is no hemodynamically significant stenosis or dissection in the bilateral common carotid, visualized portions of the bilateral proximal internal carotid or the visualized portions of the bilateral external carotid arteries. The carotid bulbs contain mild atherosclerotic calcification without significant stenosis. There is normal takeoff of the great vessels from the aortic arch. The bilateral  vertebral arteries appear patent with no evidence of dissection on either side.  The basilar tip appears normal. Limited evaluation of the dural sinuses and the intracranial and venous system shows a widely patent superior sagittal sinus, straight sinus and bilateral internal cerebral veins. The lung apices appear clear. The cervical spine contains degenerative changes at C5-C6. There is mild mucoperiosteal thickening of the right maxillary sinus.     Impression: No significant carotid stenosis. Mild right maxillary sinus disease. Electronically signed by:  Facundo Jenkins MD  8/5/2021 5:59 PM CDT Workstation: [id number]    CT Angiogram Chest    Result Date: 8/8/2021  Narrative: CT angiogram of the chest with contrast History:  Chest pain. Hypertension. Axial spiral scans of the chest were obtained  with  intravenous contrast.  Coronal and sagittal reconstructions and both oblique coronal MIPS obtained the same workstation. This exam was performed according to our departmental dose-optimization program, which includes automated exposure control, adjustment of the mA and/or kV according to patient size and/or use of iterative reconstruction technique. DLP: 237.10 Comparison: None Findings: No pulmonary embolism. No mediastinal hematoma. No hilar, mediastinal or axillary adenopathy. No thoracic aortic aneurysm or dissection. No pericardial effusion. The lungs are clear of an acute process. No mass or noncalcified nodule. No pleural effusion. No pneumothorax. Fatty infiltration of the liver. Distended gallbladder. Questionable cholelithiasis. 1.8 cm left renal cyst No acute osseous abnormality. Old compression deformities thoracic spine. Degenerative changes and degenerative disc disease thoracic spine. Deformity right humeral head which may be related prior trauma.     Impression: Conclusion: No Pulmonary Embolism. Fatty infiltration of the liver. Distended gallbladder. Questionable cholelithiasis. 1.8 cm left renal  cyst [id number] Electronically signed by:  Danial Mckinney MD  8/8/2021 2:37 PM CDT Workstation: [id number]                                         [facility]    Final diagnoses:   Chest pain, unspecified type   Closed displaced fracture of greater tuberosity of right humerus, initial encounter   Hypokalemia   Hypertensive urgency       ED Disposition  ED Disposition     ED Disposition Condition Comment    Decision to Admit  Level of Care: Telemetry [5]   Admitting Physician: VALERIE BANDA [[id number]]   Attending Physician: VALERIE BANDA [[id number]]   Patient Class: Observation [104]            No follow-up provider specified.       Medication List      No changes were made to your prescriptions during this visit.          Michele Casper MD  08/08/21 [id number]

## 2021-08-08 NOTE — H&P
[facility] Medicine Admission      Date of Admission: 8/8/2021      Primary Care Physician: Provider, No Known      Chief Complaint: chest pain    HPI: 49yo male with past medical hx significant of ethanol abuse(drinks 1 pint of alcohol), htn comes to the hospital due to mechanical ground level fall from tripping over chair landing on right shoulder.  of note, pt seen recently 08.05.2021 in our ED secondary to fall with known right humeral head avulsion fracture.  Pt reports subsequent onset anterior precordial chest pain unable to characterize associated with dyspnea..    Concurrent Medical History:  has a past medical history of Alcohol abuse and Hypertension.    Past Surgical History:  has no past surgical history on file.    Family History: none    Social History:  reports that he has been smoking cigarettes. He has been smoking about 1.50 packs per day. He has never used smokeless tobacco. He reports current alcohol use. He reports previous drug use.    Allergies: No Known Allergies    Medications:   Prior to Admission medications    Not on File       Review of Systems:  Review of Systems   Otherwise complete ROS is negative except as mentioned above.    Physical Exam:   Temp:  [98 °F (36.7 °C)] 98 °F (36.7 °C)  Heart Rate:  [] 80  Resp:  [14-22] 14  BP: (155-189)/(103-115) 155/105  Physical Exam  Vitals and nursing note reviewed.   Constitutional:       Appearance: Normal appearance.   HENT:      Head: Normocephalic and atraumatic.      Right Ear: External ear normal.      Left Ear: External ear normal.      Nose: Nose normal.      Mouth/Throat:      Mouth: Mucous membranes are moist.   Eyes:      Pupils: Pupils are equal, round, and reactive to light.   Cardiovascular:      Rate and Rhythm: Normal rate and regular rhythm.      Pulses: Normal pulses.      Heart sounds: Normal heart sounds. No murmur heard.   No friction rub. No gallop.    Pulmonary:       Effort: Pulmonary effort is normal. No respiratory distress.      Breath sounds: Normal breath sounds. No wheezing, rhonchi or rales.   Chest:      Chest wall: No tenderness.   Abdominal:      General: Abdomen is flat. Bowel sounds are normal. There is no distension.      Palpations: Abdomen is soft.      Tenderness: There is no abdominal tenderness. There is no guarding or rebound.   Musculoskeletal:      Right shoulder: Tenderness and bony tenderness present. No swelling or deformity. Decreased range of motion.        Arms:       Cervical back: Normal range of motion and neck supple. No rigidity or tenderness.   Lymphadenopathy:      Cervical: No cervical adenopathy.   Skin:     General: Skin is warm and dry.   Neurological:      General: No focal deficit present.      Mental Status: He is alert and oriented to person, place, and time.      GCS: GCS eye subscore is 4. GCS verbal subscore is 5. GCS motor subscore is 6.     Results Reviewed:  I have personally reviewed current lab, radiology, and data and agree with results.  Lab Results (last 24 hours)     Procedure Component Value Units Date/Time    Troponin [[id number]]  (Normal) Collected: 08/08/21 1456    Specimen: Blood from Arm, Left Updated: 08/08/21 1516     Troponin T <0.010 ng/mL     Narrative:      Troponin T Reference Range:  <= 0.03 ng/mL-   Negative for AMI  >0.03 ng/mL-     Abnormal for myocardial necrosis.  Clinicians would have to utilize clinical acumen, EKG, Troponin and serial changes to determine if it is an Acute Myocardial Infarction or myocardial injury due to an underlying chronic condition.       Results may be falsely decreased if patient taking Biotin.      Magnesium [[id number]]  (Abnormal) Collected: 08/08/21 1456    Specimen: Blood from Arm, Left Updated: 08/08/21 1513     Magnesium 1.0 mg/dL     Urine Drug Screen - Urine, Clean Catch [[id number]] Collected: 08/08/21 1452    Specimen: Urine, Clean Catch Updated: 08/08/21 1459    COVID-19  and FLU A/B PCR - Swab, Nasopharynx [[id number]]  (Normal) Collected: 08/08/21 1309    Specimen: Swab from Nasopharynx Updated: 08/08/21 1350     COVID19 Not Detected     Influenza A PCR Not Detected     Influenza B PCR Not Detected    Narrative:      Fact sheet for providers: https://www.fda.gov/media/779476/download    Fact sheet for patients: https://www.fda.gov/media/149661/download    Test performed by PCR.    Comprehensive Metabolic Panel [[id number]]  (Abnormal) Collected: 08/08/21 1320    Specimen: Blood Updated: 08/08/21 1349     Glucose 129 mg/dL      BUN 7 mg/dL      Creatinine 0.58 mg/dL      Sodium 133 mmol/L      Potassium 2.7 mmol/L      Comment: Slight hemolysis detected by analyzer. Results may be affected.        Chloride 90 mmol/L      CO2 28.0 mmol/L      Calcium 10.3 mg/dL      Total Protein 7.4 g/dL      Albumin 4.10 g/dL      ALT (SGPT) 148 U/L      AST (SGOT) 247 U/L      Alkaline Phosphatase 130 U/L      Total Bilirubin 2.0 mg/dL      eGFR Non African Amer 150 mL/min/1.73      Globulin 3.3 gm/dL      A/G Ratio 1.2 g/dL      BUN/Creatinine Ratio 12.1     Anion Gap 15.0 mmol/L     Narrative:      GFR Normal >60  Chronic Kidney Disease <60  Kidney Failure <15      Ethanol [[id number]] Collected: 08/08/21 1320    Specimen: Blood Updated: 08/08/21 1349     Ethanol <10 mg/dL      Ethanol % <0.010 %     Troponin [[id number]]  (Normal) Collected: 08/08/21 1320    Specimen: Blood Updated: 08/08/21 1347     Troponin T <0.010 ng/mL     Narrative:      Troponin T Reference Range:  <= 0.03 ng/mL-   Negative for AMI  >0.03 ng/mL-     Abnormal for myocardial necrosis.  Clinicians would have to utilize clinical acumen, EKG, Troponin and serial changes to determine if it is an Acute Myocardial Infarction or myocardial injury due to an underlying chronic condition.       Results may be falsely decreased if patient taking Biotin.      BNP [[id number]]  (Normal) Collected: 08/08/21 1320    Specimen: Blood Updated:  08/08/21 1347     proBNP 175.1 pg/mL     Narrative:      Among patients with dyspnea, NT-proBNP is highly sensitive for the detection of acute congestive heart failure. In addition NT-proBNP of <300 pg/ml effectively rules out acute congestive heart failure with 99% negative predictive value.    Results may be falsely decreased if patient taking Biotin.      Protime-INR [[id number]]  (Normal) Collected: 08/08/21 1320    Specimen: Blood Updated: 08/08/21 1343     Protime 12.5 Seconds      INR 0.94    Narrative:      Therapeutic range for most indications is 2.0-3.0 INR,  or 2.5-3.5 for mechanical heart valves.    aPTT [[id number]]  (Normal) Collected: 08/08/21 1320    Specimen: Blood Updated: 08/08/21 1343     PTT 29.7 seconds     Narrative:      The recommended Heparin therapeutic range is 68-97 seconds.    D-dimer, Quantitative [[id number]]  (Abnormal) Collected: 08/08/21 1320    Specimen: Blood Updated: 08/08/21 1343     D-Dimer, Quantitative 1,400 ng/mL (FEU)     Narrative:      Dimer values <500 ng/ml FEU are FDA approved as aid in diagnosis of deep venous thrombosis and pulmonary embolism.  This test should not be used in an exclusion strategy with pretest probability alone.    A recent guideline regarding diagnosis for pulmonary thromboembolism recommends an adjusted exclusion criterion of age x 10 ng/ml FEU for patients >50 years of age ([employer] Intern Med 2015; 163: 701-711).      CBC & Differential [[id number]]  (Abnormal) Collected: 08/08/21 1320    Specimen: Blood Updated: 08/08/21 1329    Narrative:      The following orders were created for panel order CBC & Differential.  Procedure                               Abnormality         Status                     ---------                               -----------         ------                     CBC Auto Differential[[id number]]        Abnormal            Final result                 Please view results for these tests on the individual orders.    CBC Auto  Differential [[id number]]  (Abnormal) Collected: 08/08/21 1320    Specimen: Blood Updated: 08/08/21 1329     WBC 8.16 10*3/mm3      RBC 4.58 10*6/mm3      Hemoglobin 15.5 g/dL      Hematocrit 44.3 %      MCV 96.7 fL      MCH 33.8 pg      MCHC 35.0 g/dL      RDW 15.4 %      RDW-SD 55.5 fl      MPV 9.7 fL      Platelets 93 10*3/mm3      Neutrophil % 68.0 %      Lymphocyte % 15.2 %      Monocyte % 15.3 %      Eosinophil % 0.2 %      Basophil % 0.9 %      Immature Grans % 0.4 %      Neutrophils, Absolute 5.55 10*3/mm3      Lymphocytes, Absolute 1.24 10*3/mm3      Monocytes, Absolute 1.25 10*3/mm3      Eosinophils, Absolute 0.02 10*3/mm3      Basophils, Absolute 0.07 10*3/mm3      Immature Grans, Absolute 0.03 10*3/mm3      nRBC 0.0 /100 WBC         Imaging Results (Last 24 Hours)     Procedure Component Value Units Date/Time    CT Angiogram Chest [[id number]] Collected: 08/08/21 1344     Updated: 08/08/21 1439    Narrative:      CT angiogram of the chest with contrast    History:  Chest pain. Hypertension.    Axial spiral scans of the chest were obtained  with  intravenous  contrast.  Coronal and sagittal reconstructions and both oblique  coronal MIPS obtained the same workstation.    This exam was performed according to our departmental  dose-optimization program, which includes automated exposure  control, adjustment of the mA and/or kV according to patient size  and/or use of iterative reconstruction technique.    DLP: 237.10    Comparison: None    Findings:  No pulmonary embolism.  No mediastinal hematoma.  No hilar, mediastinal or axillary adenopathy.  No thoracic aortic aneurysm or dissection.  No pericardial effusion.    The lungs are clear of an acute process.  No mass or noncalcified nodule.  No pleural effusion.  No pneumothorax.    Fatty infiltration of the liver.  Distended gallbladder.  Questionable cholelithiasis.  1.8 cm left renal cyst    No acute osseous abnormality.  Old compression deformities thoracic  spine.  Degenerative changes and degenerative disc disease thoracic  spine.  Deformity right humeral head which may be related prior trauma.      Impression:      Conclusion:  No Pulmonary Embolism.  Fatty infiltration of the liver.  Distended gallbladder.  Questionable cholelithiasis.  1.8 cm left renal cyst    [id number]    Electronically signed by:  Danial Mckinney MD  8/8/2021 2:37 PM CDT  Workstation: [email or web address]    XR Chest 1 View [[id number]] Collected: 08/08/21 1238     Updated: 08/08/21 1312    Narrative:        PORTABLE CHEST    HISTORY: Chest pain    Portable AP film of the chest was obtained at 12:57 PM.  COMPARISON: None    FINDINGS:   EKG leads.  The lungs are clear of an acute process.  The heart is not enlarged.  The pulmonary vasculature is not increased.  No pleural effusion.  No pneumothorax.  No acute osseous abnormality.  Previously demonstrated displaced avulsion fracture lateral  aspect of the right humeral head.  Old left rib fractures.  Calcific tendinitis or bursitis left shoulder.  Old fracture left clavicle.      Impression:      CONCLUSION:  No acute cardiopulmonary disease.  Previously demonstrated displaced avulsion fracture lateral  aspect of the right humeral head.  Old left rib fractures.  Calcific tendinitis or bursitis left shoulder.  Old fracture left clavicle.    [id number]    Electronically signed by:  Danial Mckinney MD  8/8/2021 1:11 PM CDT  Workstation: [email or web address]    XR Humerus Right [[id number]] Collected: 08/08/21 1239     Updated: 08/08/21 1310    Narrative:        Two view right humerus    HISTORY: Arm pain. Fell.    AP and lateral views obtained.    COMPARISON: Right shoulder August 5, 2021.    FINDINGS:   Previously demonstrated displaced avulsion fracture lateral  aspect of the humeral head.  No dislocation.  No other osseous or articular abnormality.      Impression:      CONCLUSION:  Previously demonstrated displaced avulsion fracture lateral  aspect of the humeral  head.    [id number]    Electronically signed by:  Danial Mckinney MD  8/8/2021 1:08 PM CDT  Workstation: [employer]    XR Shoulder 2+ View Right [[id number]] Collected: 08/08/21 1238     Updated: 08/08/21 1308    Narrative:        Three view right shoulder    HISTORY: Shoulder pain. Fell.    AP films with the humerus in internal and external rotation and  scapular Y view were obtained.    COMPARISON: August 5, 2021    FINDINGS:   Previously demonstrated displaced avulsion fracture lateral  aspect of the humeral head.  Hypertrophic change acromioclavicular joint.  No fracture or dislocation.  No other osseous or articular abnormality.      Impression:      CONCLUSION:  Previously demonstrated displaced avulsion fracture lateral  aspect of the humeral head.  Hypertrophic change acromioclavicular joint.    [id number]    Electronically signed by:  Danial Mckinney MD  8/8/2021 1:07 PM CDT  Workstation: [employer]            Assessment:    Active Hospital Problems    Diagnosis    • Chest pain              Plan:          Chest Pain  -will r/o acs  -trend troponins  - no acute ekg changes  -check hgb a1c, ldl, lipid panel in am  -cw asa, ntg prn    Hypertensive Urgency  -iv labetolol prn    Alcohol Abuse  -educated and encouraged cessation, pt expresses understanding  -cw ciwa protocol    Closed displaced fracture of greater tuberosity of right humerus  -cw outpatient f/u with orthopedic surgery as scheduled    Hypokalemia  -check mg  -electrolyte replacement protocol    dvt ppx heparin

## 2021-08-08 NOTE — ED NOTES
Pt has sling at bedside. While in bed pt informed that he does not need to wear it.      Malia Huber RN  08/08/21 3453

## 2021-08-08 NOTE — ED NOTES
Can not transport patient to the floor until we hear back from case management due to pt being a [facility] patient.      Malia Huber RN  08/08/21 0070

## 2021-08-09 LAB
ALBUMIN SERPL-MCNC: 3.5 G/DL (ref 3.5–5.2)
ALBUMIN/GLOB SERPL: 1.3 G/DL
ALP SERPL-CCNC: 103 U/L (ref 39–117)
ALT SERPL W P-5'-P-CCNC: 101 U/L (ref 1–41)
ANION GAP SERPL CALCULATED.3IONS-SCNC: 9 MMOL/L (ref 5–15)
AST SERPL-CCNC: 142 U/L (ref 1–40)
BASOPHILS # BLD AUTO: 0.07 10*3/MM3 (ref 0–0.2)
BASOPHILS NFR BLD AUTO: 1.1 % (ref 0–1.5)
BILIRUB SERPL-MCNC: 1.4 MG/DL (ref 0–1.2)
BUN SERPL-MCNC: 5 MG/DL (ref 6–20)
BUN/CREAT SERPL: 8.9 (ref 7–25)
CALCIUM SPEC-SCNC: 9.6 MG/DL (ref 8.6–10.5)
CHLORIDE SERPL-SCNC: 95 MMOL/L (ref 98–107)
CHOLEST SERPL-MCNC: 158 MG/DL (ref 0–200)
CO2 SERPL-SCNC: 27 MMOL/L (ref 22–29)
CREAT SERPL-MCNC: 0.56 MG/DL (ref 0.76–1.27)
DEPRECATED RDW RBC AUTO: 55.4 FL (ref 37–54)
EOSINOPHIL # BLD AUTO: 0.16 10*3/MM3 (ref 0–0.4)
EOSINOPHIL NFR BLD AUTO: 2.6 % (ref 0.3–6.2)
ERYTHROCYTE [DISTWIDTH] IN BLOOD BY AUTOMATED COUNT: 15.5 % (ref 12.3–15.4)
GFR SERPL CREATININE-BSD FRML MDRD: >150 ML/MIN/1.73
GLOBULIN UR ELPH-MCNC: 2.8 GM/DL
GLUCOSE SERPL-MCNC: 111 MG/DL (ref 65–99)
HBA1C MFR BLD: 4.8 % (ref 4.8–5.6)
HCT VFR BLD AUTO: 40.1 % (ref 37.5–51)
HDLC SERPL-MCNC: 79 MG/DL (ref 40–60)
HGB BLD-MCNC: 14 G/DL (ref 13–17.7)
IMM GRANULOCYTES # BLD AUTO: 0.03 10*3/MM3 (ref 0–0.05)
IMM GRANULOCYTES NFR BLD AUTO: 0.5 % (ref 0–0.5)
LDLC SERPL CALC-MCNC: 63 MG/DL (ref 0–100)
LDLC/HDLC SERPL: 0.79 {RATIO}
LYMPHOCYTES # BLD AUTO: 2.04 10*3/MM3 (ref 0.7–3.1)
LYMPHOCYTES NFR BLD AUTO: 32.7 % (ref 19.6–45.3)
MAGNESIUM SERPL-MCNC: 1.2 MG/DL (ref 1.6–2.6)
MCH RBC QN AUTO: 34.1 PG (ref 26.6–33)
MCHC RBC AUTO-ENTMCNC: 34.9 G/DL (ref 31.5–35.7)
MCV RBC AUTO: 97.8 FL (ref 79–97)
MONOCYTES # BLD AUTO: 0.81 10*3/MM3 (ref 0.1–0.9)
MONOCYTES NFR BLD AUTO: 13 % (ref 5–12)
NEUTROPHILS NFR BLD AUTO: 3.12 10*3/MM3 (ref 1.7–7)
NEUTROPHILS NFR BLD AUTO: 50.1 % (ref 42.7–76)
NRBC BLD AUTO-RTO: 0 /100 WBC (ref 0–0.2)
PLATELET # BLD AUTO: 98 10*3/MM3 (ref 140–450)
PMV BLD AUTO: 10.1 FL (ref 6–12)
POTASSIUM SERPL-SCNC: 3.7 MMOL/L (ref 3.5–5.2)
PROT SERPL-MCNC: 6.3 G/DL (ref 6–8.5)
QT INTERVAL: 352 MS
QTC INTERVAL: 456 MS
RBC # BLD AUTO: 4.1 10*6/MM3 (ref 4.14–5.8)
SODIUM SERPL-SCNC: 131 MMOL/L (ref 136–145)
TRIGL SERPL-MCNC: 84 MG/DL (ref 0–150)
VLDLC SERPL-MCNC: 16 MG/DL (ref 5–40)
WBC # BLD AUTO: 6.23 10*3/MM3 (ref 3.4–10.8)

## 2021-08-09 PROCEDURE — 25010000002 HEPARIN (PORCINE) PER 1000 UNITS: Performed by: INTERNAL MEDICINE

## 2021-08-09 PROCEDURE — 85025 COMPLETE CBC W/AUTO DIFF WBC: CPT | Performed by: INTERNAL MEDICINE

## 2021-08-09 PROCEDURE — 97162 PT EVAL MOD COMPLEX 30 MIN: CPT

## 2021-08-09 PROCEDURE — 96372 THER/PROPH/DIAG INJ SC/IM: CPT

## 2021-08-09 PROCEDURE — 80061 LIPID PANEL: CPT | Performed by: INTERNAL MEDICINE

## 2021-08-09 PROCEDURE — 97166 OT EVAL MOD COMPLEX 45 MIN: CPT

## 2021-08-09 PROCEDURE — G0378 HOSPITAL OBSERVATION PER HR: HCPCS

## 2021-08-09 PROCEDURE — 80053 COMPREHEN METABOLIC PANEL: CPT | Performed by: INTERNAL MEDICINE

## 2021-08-09 PROCEDURE — 83735 ASSAY OF MAGNESIUM: CPT | Performed by: INTERNAL MEDICINE

## 2021-08-09 PROCEDURE — 36415 COLL VENOUS BLD VENIPUNCTURE: CPT | Performed by: INTERNAL MEDICINE

## 2021-08-09 PROCEDURE — 25010000002 MORPHINE PER 10 MG: Performed by: INTERNAL MEDICINE

## 2021-08-09 PROCEDURE — 96375 TX/PRO/DX INJ NEW DRUG ADDON: CPT

## 2021-08-09 PROCEDURE — 94799 UNLISTED PULMONARY SVC/PX: CPT

## 2021-08-09 PROCEDURE — 83036 HEMOGLOBIN GLYCOSYLATED A1C: CPT | Performed by: INTERNAL MEDICINE

## 2021-08-09 RX ORDER — DIPHENOXYLATE HYDROCHLORIDE AND ATROPINE SULFATE 2.5; .025 MG/1; MG/1
1 TABLET ORAL DAILY
Status: COMPLETED | OUTPATIENT
Start: 2021-08-10 | End: 2021-08-12

## 2021-08-09 RX ORDER — MORPHINE SULFATE 2 MG/ML
2 INJECTION, SOLUTION INTRAMUSCULAR; INTRAVENOUS EVERY 4 HOURS PRN
Status: DISCONTINUED | OUTPATIENT
Start: 2021-08-09 | End: 2021-08-12 | Stop reason: HOSPADM

## 2021-08-09 RX ORDER — FOLIC ACID 1 MG/1
1 TABLET ORAL DAILY
Status: COMPLETED | OUTPATIENT
Start: 2021-08-10 | End: 2021-08-12

## 2021-08-09 RX ADMIN — POTASSIUM CHLORIDE 40 MEQ: 750 CAPSULE, EXTENDED RELEASE ORAL at 02:24

## 2021-08-09 RX ADMIN — HEPARIN SODIUM 5000 UNITS: 5000 INJECTION INTRAVENOUS; SUBCUTANEOUS at 08:05

## 2021-08-09 RX ADMIN — ASPIRIN 81 MG: 81 TABLET, FILM COATED ORAL at 08:05

## 2021-08-09 RX ADMIN — SODIUM CHLORIDE 125 ML/HR: 9 INJECTION, SOLUTION INTRAVENOUS at 14:48

## 2021-08-09 RX ADMIN — MORPHINE SULFATE 2 MG: 2 INJECTION, SOLUTION INTRAMUSCULAR; INTRAVENOUS at 23:57

## 2021-08-09 RX ADMIN — HEPARIN SODIUM 5000 UNITS: 5000 INJECTION INTRAVENOUS; SUBCUTANEOUS at 20:33

## 2021-08-09 NOTE — PLAN OF CARE
Goal Outcome Evaluation:  Plan of Care Reviewed With: patient           Outcome Summary: OT eval completed; co-eval with PT. Pt pleasant and cooperative. Pt rec'd just back to bathroom. Pt reported increased pain in RUE. Therapy assisted with donning sling. Pt /102 so mobility/activity limited; RN notified. Pt performed supine<>sit with SBA, sit<>Stand with CGA, and took side steps towards HOB with CGA. Pt assisted back to bed with RUE elevated. Pt presents with increased pain and decreased balance, mobility, and ADL performance. Pt would benefit from continued skilled OT services to address deficits and  promote highest level of functioning. Pt reports concerns with dispo. Pt reports after last d/c he was staying alone at hotel and was having difficulty caring for himself due to RUE limitations. Pt is unsure where he will go after d/c and does not have any family members to assist. Pt would benefit from assist post d/c.

## 2021-08-09 NOTE — PROGRESS NOTES
[facility] Medicine Services  INPATIENT PROGRESS NOTE    Length of Stay: 0  Date of Admission: 8/8/2021  Primary Care Physician: Provider, No Known    Subjective   Chief Complaint:chest pain     HPI: 49yo male with past medical hx significant of ethanol abuse(drinks 1 pint of alcohol), htn comes to the hospital due to mechanical ground level fall from tripping over chair landing on right shoulder.  of note, pt seen recently 08.05.2021 in our ED secondary to fall with known right humeral head avulsion fracture.      Pt feels better. No acute events overnight  Review of Systems     All pertinent negatives and positives are as above. All other systems have been reviewed and are negative unless otherwise stated.     Objective    Temp:  [96.9 °F (36.1 °C)-98.9 °F (37.2 °C)] 98.2 °F (36.8 °C)  Heart Rate:  [78-97] 97  Resp:  [16-19] 16  BP: (138-169)/() 152/102    Physical Exam  Vitals and nursing note reviewed.   Constitutional:       Appearance: Normal appearance.   HENT:      Head: Normocephalic and atraumatic.      Right Ear: External ear normal.      Left Ear: External ear normal.      Nose: Nose normal.      Mouth/Throat:      Mouth: Mucous membranes are moist.   Eyes:      Pupils: Pupils are equal, round, and reactive to light.   Cardiovascular:      Rate and Rhythm: Normal rate and regular rhythm.      Pulses: Normal pulses.      Heart sounds: Normal heart sounds. No murmur heard.   No friction rub. No gallop.    Pulmonary:      Effort: Pulmonary effort is normal. No respiratory distress.      Breath sounds: Normal breath sounds. No wheezing, rhonchi or rales.   Chest:      Chest wall: No tenderness.   Abdominal:      General: Abdomen is flat. Bowel sounds are normal. There is no distension.      Palpations: Abdomen is soft.      Tenderness: There is no abdominal tenderness. There is no guarding or rebound.   Musculoskeletal:      Right shoulder: Tenderness and bony  tenderness present. No swelling or deformity. Decreased range of motion.        Arms:       Cervical back: Normal range of motion and neck supple. No rigidity or tenderness.   Lymphadenopathy:      Cervical: No cervical adenopathy.   Skin:     General: Skin is warm and dry.   Neurological:      General: No focal deficit present.      Mental Status: He is alert and oriented to person, place, and time.      GCS: GCS eye subscore is 4. GCS verbal subscore is 5. GCS motor subscore is 6.          Results Review:  I have reviewed the labs, radiology results, and diagnostic studies.    Laboratory Data:   Results from last 7 days   Lab Units 08/09/21  0547 08/08/21  1713 08/08/21  1320 08/05/21  1325 08/05/21  1325   SODIUM mmol/L 131*  --  133*  --  130*   POTASSIUM mmol/L 3.7 2.8* 2.7*   < > 2.9*   CHLORIDE mmol/L 95*  --  90*  --  87*   CO2 mmol/L 27.0  --  28.0  --  26.0   BUN mg/dL 5*  --  7  --  5*   CREATININE mg/dL 0.56*  --  0.58*  --  0.65*   GLUCOSE mg/dL 111*  --  129*  --  106*   CALCIUM mg/dL 9.6  --  10.3  --  9.9   BILIRUBIN mg/dL 1.4*  --  2.0*  --  1.8*   ALK PHOS U/L 103  --  130*  --  118*   ALT (SGPT) U/L 101*  --  148*  --  99*   AST (SGOT) U/L 142*  --  247*  --  177*   ANION GAP mmol/L 9.0  --  15.0  --  17.0*    < > = values in this interval not displayed.     Estimated Creatinine Clearance: 184.6 mL/min (A) (by C-G formula based on SCr of 0.56 mg/dL (L)).  Results from last 7 days   Lab Units 08/09/21  0547 08/08/21  1456 08/05/21  1325   MAGNESIUM mg/dL 1.2* 1.0* 1.5*         Results from last 7 days   Lab Units 08/09/21  0547 08/08/21  1320 08/05/21  1324   WBC 10*3/mm3 6.23 8.16 12.17*   HEMOGLOBIN g/dL 14.0 15.5 17.0   HEMATOCRIT % 40.1 44.3 47.6   PLATELETS 10*3/mm3 98* 93* 76*     Results from last 7 days   Lab Units 08/08/21  1320   INR  0.94       Culture Data:   No results found for: BLOODCX  No results found for: URINECX  No results found for: RESPCX  No results found for: WOUNDCX  No  results found for: STOOLCX  No components found for: BODYFLD    Radiology Data:   Imaging Results (Last 24 Hours)     ** No results found for the last 24 hours. **          I have reviewed the patient's current medications.     Assessment/Plan     Active Hospital Problems    Diagnosis    • Chest pain        Plan:      Hypokalemia and Hypomagnesemia  -replace and monitor    Chest Pain  -will r/o acs  -trend troponins  - no acute ekg changes  -check hgb a1c, ldl, lipid panel in am  -cw asa, ntg prn     Hypertensive Urgency  -iv labetolol prn     Alcohol Abuse  -educated and encouraged cessation, pt expresses understanding  -cw ciwa protocol     Closed displaced fracture of greater tuberosity of right humerus  -cw outpatient f/u with orthopedic surgery as scheduled     Hypokalemia  -check mg  -electrolyte replacement protocol     dvt ppx heparin

## 2021-08-09 NOTE — CONSULTS
Adult Nutrition  Assessment    Patient Name:  Hitesh Rodarte  YOB: 1970  MRN: [id number]  Admit Date:  8/8/2021    Assessment Date:  8/9/2021    Comments:  RD consult r/t MST 3. Admit r/t chest pain, recent admit (8/5) r/t humerus fx. Pt reports poor intake pta d/t illness and stress, denies weight loss. No gi distress. Labs: Na 131, BUN 5, . Hx includes ETOH abuse. Pt has eating well since admit. Declines supplement. RD will monitor course and make recs as indicated.     Reason for Assessment     Row Name 08/09/21 1048          Reason for Assessment    Reason For Assessment  identified at risk by screening criteria     Identified At Risk by Screening Criteria  MST SCORE 2+         Nutrition/Diet History     Row Name 08/09/21 1048          Nutrition/Diet History    Typical Food/Fluid Intake  Pt says his normal weight is ~180#. He says his appetite has been poor d/t illness and stress.         Anthropometrics     Row Name 08/09/21 0412          Anthropometrics    Weight  80.9 kg (178 lb 6.4 oz)         Labs/Tests/Procedures/Meds     Row Name 08/09/21 1049          Labs/Procedures/Meds    Lab Results Reviewed  reviewed, pertinent     Lab Results Comments  Na 131, BUN 5,         Medications    Pertinent Medications Reviewed  reviewed           Estimated/Assessed Needs     Row Name 08/09/21 1049          Calculation Measurements    Weight Used For Calculations  78 kg (172 lb)        Estimated/Assessed Needs    Additional Documentation  KCAL/KG (Group);Protein Requirements (Group);Fluid Requirements (Group)        KCAL/KG    KCAL/KG  25 Kcal/Kg (kcal)     25 Kcal/Kg (kcal)  1950.475        Protein Requirements    Weight Used For Protein Calculations  78 kg (172 lb)     Est Protein Requirement Amount (gms/kg)  0.8 gm protein     Estimated Protein Requirements (gms/day)  62.42        Fluid Requirements    Fluid Requirements (mL/day)  2000     RDA Method (mL)  2000         Nutrition  Prescription Ordered     Row Name 08/09/21 1050          Nutrition Prescription PO    Current PO Diet  Regular     Fluid Consistency  Thin     Common Modifiers  Cardiac         Evaluation of Received Nutrient/Fluid Intake     Row Name 08/09/21 1051          PO Evaluation    Number of Meals  1     % PO Intake  75               Electronically signed by:  Ann Slaughter RD  08/09/21 10:56 CDT

## 2021-08-09 NOTE — PLAN OF CARE
Goal Outcome Evaluation:  Plan of Care Reviewed With: patient           Outcome Summary: Pt reports poor itnake pta, but no weight loss. Eating % since admit. RD monitoring.

## 2021-08-09 NOTE — THERAPY EVALUATION
Patient Name: Hitesh Rodarte  : 1970    MRN: [id number]                              Today's Date: 2021       Admit Date: 2021    Visit Dx:     ICD-10-CM ICD-9-CM   1. Chest pain, unspecified type  R07.9 786.50   2. Closed displaced fracture of greater tuberosity of right humerus, initial encounter  S42.251A 812.03   3. Hypokalemia  E87.6 276.8   4. Hypertensive urgency  I16.0 401.9   5. Impaired mobility and ADLs  Z74.09 V49.89    Z78.9    6. Impaired functional mobility, balance, gait, and endurance  Z74.09 V49.89     Patient Active Problem List   Diagnosis   • Chest pain     Past Medical History:   Diagnosis Date   • Alcohol abuse    • Hypertension      History reviewed. No pertinent surgical history.  General Information     Row Name 21 [facility]2          Physical Therapy Time and Intention    Document Type  evaluation  -     Mode of Treatment  co-treatment;physical therapy;occupational therapy  -KW     Row Name 21 1432          General Information    Patient Profile Reviewed  yes  -KW     Prior Level of Function  independent:;gait;transfer;ADL's prior to shoulder fracture  -     Existing Precautions/Restrictions  non-weight bearing NWB RUE; RUE in sling  -[facility] Name 21 1432          Living Environment    Lives With  alone  -KW     Row Name 21 1432          Stairs Within Home, Primary    Stairs Comment, Within Home, Primary  pt reports he is homeless; he was staying at hotel after d/c from last hospitalization, but reports now he has nowhere to go because he is out of money; no means of transportation  -KW     [facility] Name 21 1432          Cognition    Orientation Status (Cognition)  oriented x 4  -KW     [facility] Name 21 1432          Safety Issues, Functional Mobility    Impairments Affecting Function (Mobility)  balance;endurance/activity tolerance;pain;strength  -KW       User Key  (r) = Recorded By, (t) = Taken By, (c) = Cosigned By    Initials Name Provider  Type    KW Mana Steve, BURTON Physical Therapist        Mobility     Row Name 08/09/21 1433          Bed Mobility    Bed Mobility  supine-sit;sit-supine  -KW     Supine-Sit [place] (Bed Mobility)  standby assist  -KW     Sit-Supine [place] (Bed Mobility)  standby assist  -KW     Assistive Device (Bed Mobility)  head of bed elevated  -KW     Row Name 08/09/21 1433          Sit-Stand Transfer    Sit-Stand [place] (Transfers)  contact guard  -KW     Row Name 08/09/21 1433          Gait/Stairs (Locomotion)    [place] Level (Gait)  contact guard  -KW     Distance in Feet (Gait)  3ft at EOB; BP elevated and deferring further mobility  -KW     Deviations/Abnormal Patterns (Gait)  gait speed decreased;stride length decreased  -KW     Row Name 08/09/21 1433          Mobility    Extremity Weight-bearing Status  right upper extremity  -KW     Right Upper Extremity (Weight-bearing Status)  non weight-bearing (NWB)  -KW       User Key  (r) = Recorded By, (t) = Taken By, (c) = Cosigned By    Initials Name Provider Type    KW Mana Steve PT Physical Therapist        Obj/Interventions     Row Name 08/09/21 1434          Range of Motion Comprehensive    Comment, General Range of Motion  BLE AROM WFL  -KW     Row Name 08/09/21 1434          Strength Comprehensive (MMT)    Comment, General Manual Muscle Testing (MMT) Assessment  BLE grossly 5/5  -KW     Row Name 08/09/21 1434          Balance    Balance Assessment  sitting static balance  -KW     Static Sitting Balance  WFL;unsupported;sitting, edge of bed  -KW     Row Name 08/09/21 1434          Sensory Assessment (Somatosensory)    Sensory Assessment (Somatosensory)  LE sensation intact BLE light touch assessed  -KW       User Key  (r) = Recorded By, (t) = Taken By, (c) = Cosigned By    Initials Name Provider Type    Mana Milner PT Physical Therapist        Goals/Plan     Row Name 08/09/21 1436          Bed Mobility Goal 1 (PT)    Activity/Assistive  Device (Bed Mobility Goal 1, PT)  sit to supine;supine to sit  -KW     [place] Level/Cues Needed (Bed Mobility Goal 1, PT)  independent  -KW     Time Frame (Bed Mobility Goal 1, PT)  3 days  -KW     Progress/Outcomes (Bed Mobility Goal 1, PT)  goal not met  -KW     Row Name 08/09/21 1436          Transfer Goal 1 (PT)    Activity/Assistive Device (Transfer Goal 1, PT)  sit-to-stand/stand-to-sit;bed-to-chair/chair-to-bed  -KW     [place]/Cues Needed (Transfer Goal 1, PT)  modified independence  -KW     Time Frame (Transfer Goal 1, PT)  4 days  -KW     Progress/Outcome (Transfer Goal 1, PT)  goal not met  -KW     Row Name 08/09/21 1436          Gait Training Goal 1 (PT)    Activity/Assistive Device (Gait Training Goal 1, PT)  gait (walking locomotion);assistive device use;decrease fall risk;increase endurance/gait distance  -KW     [place] Level (Gait Training Goal 1, PT)  modified independence  -KW     Distance (Gait Training Goal 1, PT)  50ft or more  -KW     Time Frame (Gait Training Goal 1, PT)  by discharge  -KW       User Key  (r) = Recorded By, (t) = Taken By, (c) = Cosigned By    Initials Name Provider Type    KW Mana Steve, PT Physical Therapist        Clinical Impression     Row Name 08/09/21 1434          Pain Scale: Numbers Pre/Post-Treatment    Pretreatment Pain Rating  2/10  -KW     Posttreatment Pain Rating  2/10  -KW     Pain Location - Side  Right  -KW     Pain Location  shoulder  -KW     Row Name 08/09/21 1434          Plan of Care Review    Plan of Care Reviewed With  patient  -     Row Name 08/09/21 1434          Therapy Assessment/Plan (PT)    Rehab Potential (PT)  good, to achieve stated therapy goals  -KW     Criteria for Skilled Interventions Met (PT)  yes;meets criteria  -KW     Predicted Duration of Therapy Intervention (PT)  until goals met or d/c from acute care  -     Row Name 08/09/21 1434          Vital Signs    Pre Systolic BP Rehab  152  -KW     Pre Treatment  Diastolic BP  102  -KW     Post Systolic BP Rehab  150 RN notified of elevated BP  -KW     Post Treatment Diastolic BP  108  -KW     Pretreatment Heart Rate (beats/min)  84  -KW     Pre SpO2 (%)  97  -KW     O2 Delivery Pre Treatment  room air  -KW     Pre Patient Position  Supine  -KW     Post Patient Position  Supine  -KW     Row Name 08/09/21 1434          Positioning and Restraints    Pre-Treatment Position  in bed  -KW     Post Treatment Position  bed  -KW     In Bed  notified nsg;fowlers;call light within reach;encouraged to call for assist;exit alarm on;side rails up x2  -KW       User Key  (r) = Recorded By, (t) = Taken By, (c) = Cosigned By    Initials Name Provider Type    Mana Milner, BURTON Physical Therapist        Outcome Measures     Row Name 08/09/21 1437          How much help from another person do you currently need...    Turning from your back to your side while in flat bed without using bedrails?  3  -KW     Moving from lying on back to sitting on the side of a flat bed without bedrails?  4  -KW     Moving to and from a bed to a chair (including a wheelchair)?  3  -KW     Standing up from a chair using your arms (e.g., wheelchair, bedside chair)?  3  -KW     Climbing 3-5 steps with a railing?  3  -KW     To walk in hospital room?  3  -KW     AM-PAC 6 Clicks Score (PT)  19  -KW     Row Name 08/09/21 1437 08/09/21 1100       Functional Assessment    Outcome Measure Options  AM-PAC 6 Clicks Basic Mobility (PT)  -KW  AM-PAC 6 Clicks Daily Activity (OT)  -AS      User Key  (r) = Recorded By, (t) = Taken By, (c) = Cosigned By    Initials Name Provider Type    AS Jennifer Hwang OT Occupational Therapist    Mana Milner, BURTON Physical Therapist                       Physical Therapy Education                 Title: PT OT SLP Therapies (In Progress)     Topic: Physical Therapy (In Progress)     Point: Mobility training (Done)     Learning Progress Summary           Patient Acceptance, E, VU by RONNY  at 8/9/2021 1437    Comment: Role of PT, POC, use of gait belt                   Point: Home exercise program (Not Started)     Learner Progress:  Not documented in this visit.          Point: Body mechanics (Not Started)     Learner Progress:  Not documented in this visit.          Point: Precautions (Done)     Learning Progress Summary           Patient Acceptance, E, VU by  at 8/9/2021 1437    Comment: Role of PT, POC, use of gait belt                               User Key     Initials Effective Dates Name Provider Type Discipline     06/16/21 -  Mana Steve PT Physical Therapist PT              PT Recommendation and Plan  Planned Therapy Interventions (PT): balance training, bed mobility training, gait training, home exercise program, transfer training, stretching, patient/family education, strengthening, stair training  Plan of Care Reviewed With: patient  Outcome Summary: PT evaluation completed this date as co-eval with OT. Pt pleasant and agreeable to eval. Pt with recent humeral fracture (8/5/21) and was given sling in ED, but has not seen ortho and reports he does not have OP appt set up at this time to see ortho. Pt's BP elevated at 152/102, limiting mobility with eval this date; RN notified. Pt performing sit<>supine with SBA and sitting EOB WFL. Pt performing sit<>stand with CGA and ambulating 3ft at EOB with CGA. Pt would benefit from further skilled PT to increase functional mobility, endurance, strength, safety, balance, and to progress towards PLOF. Upon d/c from acute care, recommend home with assist. Pt would benefit from ortho consult while inpatient.     Time Calculation:   PT Charges     Row Name 08/09/21 1441             Time Calculation    Start Time  1100  -KW      Stop Time  1127  -KW      Time Calculation (min)  27 min  -KW      PT Received On  08/09/21  -KW      PT Goal Re-Cert Due Date  08/22/21  -KW        User Key  (r) = Recorded By, (t) = Taken By, (c) = Cosigned By     Initials Name Provider Type    KW Mana Steve, PT Physical Therapist        Therapy Charges for Today     Code Description Service Date Service Provider Modifiers Qty    86419546783 HC PT EVAL MOD COMPLEXITY 2 8/9/2021 Mana Steve, PT GP 1          PT G-Codes  Outcome Measure Options: AM-PAC 6 Clicks Basic Mobility (PT)  AM-PAC 6 Clicks Score (PT): 19  AM-PAC 6 Clicks Score (OT): 17    Mana Steve PT  8/9/2021

## 2021-08-09 NOTE — PLAN OF CARE
Goal Outcome Evaluation:  Plan of Care Reviewed With: patient           Outcome Summary: PT evaluation completed this date as co-eval with OT. Pt pleasant and agreeable to eval. Pt with recent humeral fracture (8/5/21) and was given sling in ED, but has not seen ortho and reports he does not have OP appt set up at this time to see ortho. Pt's BP elevated at 152/102, limiting mobility with eval this date; RN notified. Pt performing sit<>supine with SBA and sitting EOB WFL. Pt performing sit<>stand with CGA and ambulating 3ft at EOB with CGA. Pt would benefit from further skilled PT to increase functional mobility, endurance, strength, safety, balance, and to progress towards PLOF. Upon d/c from acute care, recommend home with assist. Pt would benefit from ortho consult while inpatient.

## 2021-08-09 NOTE — THERAPY EVALUATION
Patient Name: Hitesh Rodarte  : 1970    MRN: [id number]                              Today's Date: 2021       Admit Date: 2021    Visit Dx:     ICD-10-CM ICD-9-CM   1. Chest pain, unspecified type  R07.9 786.50   2. Closed displaced fracture of greater tuberosity of right humerus, initial encounter  S42.251A 812.03   3. Hypokalemia  E87.6 276.8   4. Hypertensive urgency  I16.0 401.9   5. Impaired mobility and ADLs  Z74.09 V49.89    Z78.9      Patient Active Problem List   Diagnosis   • Chest pain     Past Medical History:   Diagnosis Date   • Alcohol abuse    • Hypertension      History reviewed. No pertinent surgical history.  General Information     Row Name 21 1100          OT Time and Intention    Document Type  evaluation  -AS     Mode of Treatment  co-treatment;physical therapy;occupational therapy  -AS     Row Name 21 1100          General Information    Patient Profile Reviewed  yes  -AS     Prior Level of Function  independent:;ADL's;gait;transfer;all household mobility prior to shld fx  -AS     Existing Precautions/Restrictions  non-weight bearing NWB RUE; RUE in sling  -AS     Row Name 21 1100          Living Environment    Lives With  alone  -AS     Row Name 21 1100          Stairs Within Home, Primary    Stairs Comment, Within Home, Primary  pt reports he is homeless; he was staying at hotel after d/c from last hospitalization, but reports now he has nowhere to go because he is out of money; no means of transportation  -AS     Row Name 21 1100          Safety Issues, Functional Mobility    Impairments Affecting Function (Mobility)  balance;endurance/activity tolerance;pain;strength  -AS       User Key  (r) = Recorded By, (t) = Taken By, (c) = Cosigned By    Initials Name Provider Type    AS Jennifer Hwang OT Occupational Therapist          Mobility/ADL's     Row Name 21 1100          Bed Mobility    Bed Mobility  supine-sit;sit-supine  -AS      Supine-Sit [place] (Bed Mobility)  standby assist  -AS     Sit-Supine [place] (Bed Mobility)  standby assist  -AS     Bed Mobility, Safety Issues  decreased use of arms for pushing/pulling  -AS     Assistive Device (Bed Mobility)  head of bed elevated  -AS     Row Name 08/09/21 1100          Transfers    Transfers  sit-stand transfer  -AS     Sit-Stand [place] (Transfers)  contact guard  -AS     Row Name 08/09/21 1100          Functional Mobility    Functional Mobility- Ind. Level  contact guard assist  -AS     Functional Mobility- Comment  side steps towards HOB  -AS     Row Name 08/09/21 1100          Mobility    Extremity Weight-bearing Status  right upper extremity  -AS     Right Upper Extremity (Weight-bearing Status)  non weight-bearing (NWB)  -AS       User Key  (r) = Recorded By, (t) = Taken By, (c) = Cosigned By    Initials Name Provider Type    AS Jennifer Hwang OT Occupational Therapist        Obj/Interventions     Row Name 08/09/21 1100          Sensory Assessment (Somatosensory)    Sensory Assessment (Somatosensory)  UE sensation intact  -AS     Row Name 08/09/21 1100          Range of Motion Comprehensive    Comment, General Range of Motion  RUE wrist and hand intact; LUE intact  -AS     Row Name 08/09/21 1100          Strength Comprehensive (MMT)    Comment, General Manual Muscle Testing (MMT) Assessment  LUE WFL; RUE NT 2/2 NWB  -AS       User Key  (r) = Recorded By, (t) = Taken By, (c) = Cosigned By    Initials Name Provider Type    AS Jennifer Hwang OT Occupational Therapist        Goals/Plan     Row Name 08/09/21 1100          Transfer Goal 1 (OT)    Activity/Assistive Device (Transfer Goal 1, OT)  sit-to-stand/stand-to-sit;bed-to-chair/chair-to-bed;toilet  -AS     [place] Level/Cues Needed (Transfer Goal 1, OT)  modified independence  -AS     Time Frame (Transfer Goal 1, OT)  by discharge;long term goal (LTG)  -AS     Progress/Outcome (Transfer Goal 1, OT)  goal not met   -AS     Row Name 08/09/21 1100          Dressing Goal 1 (OT)    Activity/Device (Dressing Goal 1, OT)  dressing skills, all  -AS     [place]/Cues Needed (Dressing Goal 1, OT)  set-up required;supervision required  -AS     Time Frame (Dressing Goal 1, OT)  long term goal (LTG);by discharge  -AS     Progress/Outcome (Dressing Goal 1, OT)  goal not met  -AS     Row Name 08/09/21 1100          Toileting Goal 1 (OT)    Activity/Device (Toileting Goal 1, OT)  toileting skills, all  -AS     [place] Level/Cues Needed (Toileting Goal 1, OT)  modified independence  -AS     Time Frame (Toileting Goal 1, OT)  long term goal (LTG);by discharge  -AS     Progress/Outcome (Toileting Goal 1, OT)  goal not met  -AS     Row Name 08/09/21 1100          Therapy Assessment/Plan (OT)    Planned Therapy Interventions (OT)  activity tolerance training;functional balance retraining;occupation/activity based interventions;ROM/therapeutic exercise;strengthening exercise;transfer/mobility retraining;adaptive equipment training;BADL retraining;patient/caregiver education/training  -AS       User Key  (r) = Recorded By, (t) = Taken By, (c) = Cosigned By    Initials Name Provider Type    AS Jennifer Hwang, OT Occupational Therapist        Clinical Impression     Row Name 08/09/21 1100          Pain Assessment    Additional Documentation  Pain Scale: Numbers Pre/Post-Treatment (Group)  -AS     Row Name 08/09/21 1100          Pain Scale: Numbers Pre/Post-Treatment    Pretreatment Pain Rating  2/10  -AS     Posttreatment Pain Rating  2/10  -AS     Pain Location - Side  Right  -AS     Pain Location  shoulder  -AS     Row Name 08/09/21 1100          Plan of Care Review    Plan of Care Reviewed With  patient  -AS     Row Name 08/09/21 1100          Therapy Assessment/Plan (OT)    Rehab Potential (OT)  good, to achieve stated therapy goals  -AS     Criteria for Skilled Therapeutic Interventions Met (OT)  yes;meets criteria;skilled treatment  is necessary  -AS     Therapy Frequency (OT)  other (see comments) 5-7days/wk  -AS     Row Name 08/09/21 1100          Therapy Plan Review/Discharge Plan (OT)    Anticipated Discharge Disposition (OT)  home with assist  -AS     Row Name 08/09/21 1100          Vital Signs    Pre Systolic BP Rehab  152  -AS     Pre Treatment Diastolic BP  102  -AS     Post Systolic BP Rehab  150  -AS     Post Treatment Diastolic BP  108  -AS     Pretreatment Heart Rate (beats/min)  84  -AS     Pre SpO2 (%)  97  -AS     O2 Delivery Pre Treatment  room air  -AS     Pre Patient Position  Supine  -AS     Post Patient Position  Supine  -AS     Row Name 08/09/21 1100          Positioning and Restraints    Pre-Treatment Position  in bed  -AS     Post Treatment Position  bed  -AS     In Bed  notified nsg;supine;encouraged to call for assist;exit alarm on;call light within reach  -AS       User Key  (r) = Recorded By, (t) = Taken By, (c) = Cosigned By    Initials Name Provider Type    AS Jennifer Hwang OT Occupational Therapist        Outcome Measures     Row Name 08/09/21 1100          How much help from another is currently needed...    Putting on and taking off regular lower body clothing?  2  -AS     Bathing (including washing, rinsing, and drying)  3  -AS     Toileting (which includes using toilet bed pan or urinal)  3  -AS     Putting on and taking off regular upper body clothing  3  -AS     Taking care of personal grooming (such as brushing teeth)  3  -AS     Eating meals  3  -AS     AM-PAC 6 Clicks Score (OT)  17  -AS     Row Name 08/09/21 1100          Functional Assessment    Outcome Measure Options  AM-PAC 6 Clicks Daily Activity (OT)  -AS       User Key  (r) = Recorded By, (t) = Taken By, (c) = Cosigned By    Initials Name Provider Type    AS Jennifer Hwang OT Occupational Therapist          Occupational Therapy Education                 Title: PT OT SLP Therapies (In Progress)     Topic: Occupational Therapy (In Progress)      Point: ADL training (Not Started)     Description:   Instruct learner(s) on proper safety adaptation and remediation techniques during self care or transfers.   Instruct in proper use of assistive devices.              Learner Progress:  Not documented in this visit.          Point: Home exercise program (Not Started)     Description:   Instruct learner(s) on appropriate technique for monitoring, assisting and/or progressing therapeutic exercises/activities.              Learner Progress:  Not documented in this visit.          Point: Precautions (Done)     Description:   Instruct learner(s) on prescribed precautions during self-care and functional transfers.              Learning Progress Summary           Patient Acceptance, E, VU by AS at 8/9/2021 7121    Comment: role of OT, OT POC, bed mobility, transfer training                   Point: Body mechanics (Not Started)     Description:   Instruct learner(s) on proper positioning and spine alignment during self-care, functional mobility activities and/or exercises.              Learner Progress:  Not documented in this visit.                      User Key     Initials Effective Dates Name Provider Type Discipline    AS 03/18/21 -  Jennifer Hwang OT Occupational Therapist OT              OT Recommendation and Plan  Planned Therapy Interventions (OT): activity tolerance training, functional balance retraining, occupation/activity based interventions, ROM/therapeutic exercise, strengthening exercise, transfer/mobility retraining, adaptive equipment training, BADL retraining, patient/caregiver education/training  Therapy Frequency (OT): other (see comments) (5-7days/wk)  Plan of Care Review  Plan of Care Reviewed With: patient  Outcome Summary: OT eval completed; co-eval with PT. Pt pleasant and cooperative. Pt rec'd just back to bathroom. Pt reported increased pain in RUE. Therapy assisted with donning sling. Pt /102 so mobility/activity limited; RN  notified. Pt performed supine<>sit with SBA, sit<>Stand with CGA, and took side steps towards HOB with CGA. Pt assisted back to bed with RUE elevated. Pt presents with increased pain and decreased balance, mobility, and ADL performance. Pt would benefit from continued skilled OT services to address deficits and  promote highest level of functioning. Pt reports concerns with dispo. Pt reports after last d/c he was staying alone at hotel and was having difficulty caring for himself due to RUE limitations. Pt is unsure where he will go after d/c and does not have any family members to assist. Pt would benefit from assist post d/c.     Time Calculation:   Time Calculation- OT     Row Name 08/09/21 1436             Time Calculation- OT    OT Start Time  1100  -AS      OT Stop Time  1127  -AS      OT Time Calculation (min)  27 min  -AS      OT Received On  08/09/21  -AS      OT Goal Re-Cert Due Date  08/22/21  -AS         Untimed Charges    OT Eval/Re-eval Minutes  27  -AS         Total Minutes    Untimed Charges Total Minutes  27  -AS       Total Minutes  27  -AS        User Key  (r) = Recorded By, (t) = Taken By, (c) = Cosigned By    Initials Name Provider Type    AS Jennifer Hwang OT Occupational Therapist        Therapy Charges for Today     Code Description Service Date Service Provider Modifiers Qty    [id number] HC OT EVAL MOD COMPLEXITY 2 8/9/2021 Jennifer Hwang OT GO 1               Jennifer Hwang OT  8/9/2021

## 2021-08-09 NOTE — CASE MANAGEMENT/SOCIAL WORK
SHANELL RN AND NAOMI RN IN ER D/W ME PT IS D/C'D AND HOMELESS. I CALLED [facility] AND THEY DO HAVE A MALE BED AVAILABLE AT THIS TIME AND PT CAN COME ON . I CALLED [employer] AND SPOKE TO [place] AND SET UP TRANSPORT AND I TOLD [employer] STAFF IT IS TO BE BILLED TO THE [facility] [place]. LEFT FACE SHEET FOR NAOMY CRUZ.

## 2021-08-10 PROCEDURE — 96361 HYDRATE IV INFUSION ADD-ON: CPT

## 2021-08-10 PROCEDURE — G0378 HOSPITAL OBSERVATION PER HR: HCPCS

## 2021-08-10 PROCEDURE — 97535 SELF CARE MNGMENT TRAINING: CPT

## 2021-08-10 PROCEDURE — 25010000002 HEPARIN (PORCINE) PER 1000 UNITS: Performed by: INTERNAL MEDICINE

## 2021-08-10 PROCEDURE — 96376 TX/PRO/DX INJ SAME DRUG ADON: CPT

## 2021-08-10 PROCEDURE — 25010000002 MORPHINE PER 10 MG: Performed by: INTERNAL MEDICINE

## 2021-08-10 PROCEDURE — 96372 THER/PROPH/DIAG INJ SC/IM: CPT

## 2021-08-10 PROCEDURE — 97530 THERAPEUTIC ACTIVITIES: CPT

## 2021-08-10 RX ORDER — METOPROLOL TARTRATE 50 MG/1
50 TABLET, FILM COATED ORAL EVERY 12 HOURS SCHEDULED
Status: DISCONTINUED | OUTPATIENT
Start: 2021-08-10 | End: 2021-08-11

## 2021-08-10 RX ADMIN — ASPIRIN 81 MG: 81 TABLET, FILM COATED ORAL at 09:20

## 2021-08-10 RX ADMIN — FOLIC ACID 1 MG: 1 TABLET ORAL at 09:21

## 2021-08-10 RX ADMIN — METOPROLOL TARTRATE 50 MG: 50 TABLET, FILM COATED ORAL at 20:19

## 2021-08-10 RX ADMIN — MORPHINE SULFATE 2 MG: 2 INJECTION, SOLUTION INTRAMUSCULAR; INTRAVENOUS at 03:55

## 2021-08-10 RX ADMIN — SODIUM CHLORIDE, PRESERVATIVE FREE 10 ML: 5 INJECTION INTRAVENOUS at 20:19

## 2021-08-10 RX ADMIN — HEPARIN SODIUM 5000 UNITS: 5000 INJECTION INTRAVENOUS; SUBCUTANEOUS at 09:20

## 2021-08-10 RX ADMIN — SODIUM CHLORIDE 125 ML/HR: 9 INJECTION, SOLUTION INTRAVENOUS at 21:55

## 2021-08-10 RX ADMIN — SODIUM CHLORIDE 125 ML/HR: 9 INJECTION, SOLUTION INTRAVENOUS at 13:43

## 2021-08-10 RX ADMIN — HEPARIN SODIUM 5000 UNITS: 5000 INJECTION INTRAVENOUS; SUBCUTANEOUS at 20:19

## 2021-08-10 RX ADMIN — METOPROLOL TARTRATE 50 MG: 50 TABLET, FILM COATED ORAL at 14:39

## 2021-08-10 RX ADMIN — THIAMINE HCL TAB 100 MG 100 MG: 100 TAB at 09:21

## 2021-08-10 RX ADMIN — SODIUM CHLORIDE 125 ML/HR: 9 INJECTION, SOLUTION INTRAVENOUS at 05:47

## 2021-08-10 RX ADMIN — THERA TABS 1 TABLET: TAB at 09:20

## 2021-08-10 NOTE — THERAPY TREATMENT NOTE
Patient Name: Hitesh Rodarte  : 1970    MRN: [id number]                              Today's Date: 8/10/2021       Admit Date: 2021    Visit Dx:     ICD-10-CM ICD-9-CM   1. Chest pain, unspecified type  R07.9 786.50   2. Closed displaced fracture of greater tuberosity of right humerus, initial encounter  S42.251A 812.03   3. Hypokalemia  E87.6 276.8   4. Hypertensive urgency  I16.0 401.9   5. Impaired mobility and ADLs  Z74.09 V49.89    Z78.9    6. Impaired functional mobility, balance, gait, and endurance  Z74.09 V49.89     Patient Active Problem List   Diagnosis   • Chest pain     Past Medical History:   Diagnosis Date   • Alcohol abuse    • Hypertension      History reviewed. No pertinent surgical history.  General Information     Row Name 08/10/21 1114 08/10/21 0710       OT Time and Intention    Document Type  therapy note (daily note)  -BB  therapy note (daily note)  -BB    Mode of Treatment  individual therapy;occupational therapy  -BB  individual therapy;occupational therapy  -BB    Row Name 08/10/21 1114 08/10/21 0710       General Information    Patient Profile Reviewed  yes  -BB  yes  -BB    Existing Precautions/Restrictions  non-weight bearing NWB RUE; RUE in sling  -BB  non-weight bearing NWB RUE; RUE in sling  -BB    Row Name 08/10/21 1114 08/10/21 0710       Cognition    Orientation Status (Cognition)  oriented x 4  -BB  oriented x 4  -BB    Row Name 08/10/21 1114 08/10/21 0710       Safety Issues, Functional Mobility    Impairments Affecting Function (Mobility)  balance;endurance/activity tolerance;pain;strength  -BB  balance;endurance/activity tolerance;pain;strength  -BB      User Key  (r) = Recorded By, (t) = Taken By, (c) = Cosigned By    Initials Name Provider Type    Hazel Wise COTA/L Occupational Therapy Assistant          Mobility/ADL's     Row Name 08/10/21 1114 08/10/21 0710       Bed Mobility    Bed Mobility  supine-sit;sit-supine  -BB  supine-sit;sit-supine   -BB    Supine-Sit [place] (Bed Mobility)  standby assist  -BB  standby assist  -BB    Sit-Supine [place] (Bed Mobility)  standby assist  -BB  standby assist  -BB    Bed Mobility, Safety Issues  decreased use of arms for pushing/pulling  -BB  decreased use of arms for pushing/pulling  -BB    Assistive Device (Bed Mobility)  bed rails;head of bed elevated  -BB  head of bed elevated  -BB    Row Name 08/10/21 1114 08/10/21 0710       Transfers    Transfers  --  sit-stand transfer  -BB    Sit-Stand [place] (Transfers)  contact guard  -BB  contact guard  -BB    [place] Level (Toilet Transfer)  contact guard  -BB  --    Row Name 08/10/21 1114          Toilet Transfer    Type (Toilet Transfer)  sit-stand;stand-sit  -BB     Row Name 08/10/21 1114 08/10/21 0710       Functional Mobility    Functional Mobility- Ind. Level  contact guard assist  -BB  contact guard assist  -BB    Row Name 08/10/21 0710          Activities of Daily Living    BADL Assessment/Intervention  bathing;upper body dressing;lower body dressing;grooming;toileting  -BB     Row Name 08/10/21 1114 08/10/21 0710       Mobility    Extremity Weight-bearing Status  right upper extremity  -BB  right upper extremity  -BB    Right Upper Extremity (Weight-bearing Status)  non weight-bearing (NWB)  -BB  non weight-bearing (NWB)  -BB    Row Name 08/10/21 0710          Bathing Assessment/Intervention    [place] Level (Bathing)  upper body;minimum assist (75% patient effort);verbal cues;lower body;maximum assist (25% patient effort)  -BB     Position (Bathing)  edge of bed sitting  -BB     Row Name 08/10/21 1114 08/10/21 0710       Upper Body Dressing Assessment/Training    [place] Level (Upper Body Dressing)  -- HG  -BB  doff;don;moderate assist (50% patient effort);verbal cues HG  -BB    Position (Upper Body Dressing)  --  edge of bed sitting  -BB    Row Name 08/10/21 1114 08/10/21 0710       Lower Body Dressing Assessment/Training     [place] Level (Lower Body Dressing)  doff;don;socks;dependent (less than 25% patient effort)  -BB  doff;don;socks;dependent (less than 25% patient effort)  -BB    Position (Lower Body Dressing)  edge of bed sitting  -BB  edge of bed sitting  -BB    Row Name 08/10/21 1114 08/10/21 0710       Grooming Assessment/Training    [place] Level (Grooming)  wash face, hands;moderate assist (50% patient effort)  -BB  hair care, combing/brushing;shave face;wash face, hands;moderate assist (50% patient effort)  -BB    Assistive Devices (Grooming)  --  electric razor  -BB    Position (Grooming)  edge of bed sitting  -BB  edge of bed sitting  -BB    Row Name 08/10/21 1114 08/10/21 0710       Toileting Assessment/Training    [place] Level (Toileting)  adjust/manage clothing;perform perineal hygiene;moderate assist (50% patient effort)  -BB  adjust/manage clothing;standby assist  -BB    Assistive Devices (Toileting)  commode  -BB  urinal  -BB    Position (Toileting)  supported standing  -BB  edge of bed sitting  -BB      User Key  (r) = Recorded By, (t) = Taken By, (c) = Cosigned By    Initials Name Provider Type    BB Hazel Molina COTA/L Occupational Therapy Assistant        Obj/Interventions    No documentation.       Goals/Plan     Row Name 08/10/21 1114 08/10/21 0710       Transfer Goal 1 (OT)    Activity/Assistive Device (Transfer Goal 1, OT)  sit-to-stand/stand-to-sit;bed-to-chair/chair-to-bed;toilet  -BB  sit-to-stand/stand-to-sit;bed-to-chair/chair-to-bed;toilet  -BB    [place] Level/Cues Needed (Transfer Goal 1, OT)  modified independence  -BB  modified independence  -BB    Time Frame (Transfer Goal 1, OT)  by discharge;long term goal (LTG)  -BB  by discharge;long term goal (LTG)  -BB    Progress/Outcome (Transfer Goal 1, OT)  goal not met  -BB  goal not met  -BB    Row Name 08/10/21 1114 08/10/21 0710       Dressing Goal 1 (OT)    Activity/Device (Dressing Goal 1, OT)  dressing skills, all  -BB   dressing skills, all  -BB    [place]/Cues Needed (Dressing Goal 1, OT)  set-up required;supervision required  -BB  set-up required;supervision required  -BB    Time Frame (Dressing Goal 1, OT)  long term goal (LTG);by discharge  -BB  long term goal (LTG);by discharge  -BB    Progress/Outcome (Dressing Goal 1, OT)  goal not met  -BB  goal not met  -BB    Row Name 08/10/21 1114 08/10/21 0710       Toileting Goal 1 (OT)    Activity/Device (Toileting Goal 1, OT)  toileting skills, all  -BB  toileting skills, all  -BB    [place] Level/Cues Needed (Toileting Goal 1, OT)  modified independence  -BB  modified independence  -BB    Time Frame (Toileting Goal 1, OT)  long term goal (LTG);by discharge  -BB  long term goal (LTG);by discharge  -BB    Progress/Outcome (Toileting Goal 1, OT)  goal not met  -BB  goal not met  -BB      User Key  (r) = Recorded By, (t) = Taken By, (c) = Cosigned By    Initials Name Provider Type    BB Hazel Molina COTA/L Occupational Therapy Assistant        Clinical Impression     Row Name 08/10/21 1114 08/10/21 0710       Pain Scale: Numbers Pre/Post-Treatment    Pretreatment Pain Rating  3/10  -BB  3/10  -BB    Posttreatment Pain Rating  3/10  -BB  2/10  -BB    Pain Location - Side  Right  -BB  Right  -BB    Pain Location - Orientation  upper  -BB  --    Pain Location  extremity  -BB  shoulder  -BB    Pain Intervention(s)  Repositioned  -BB  Repositioned  -BB    Row Name 08/10/21 1114 08/10/21 0710       Plan of Care Review    Plan of Care Reviewed With  patient  -BB  patient  -BB    Progress  improving  -BB  no change  -BB    Outcome Summary  Pt educated on home safety/fall prevention. Pt CGA for bed<>toilet t/f. Continue OT POC  -BB  Pt sitting up in bed and is complaining about his sling for R shoulder. Pt agrees to OT this am. Pt is Mod A for UB bathing and Max A for UB dressing. Pt is Max A for LB bathing and dependent for doffing/donning socks. Pt is Min A for grooming  tasks. Pt requires increased time and effort to complete ADL this am.  -BB    Row Name 08/10/21 1114 08/10/21 0710       Therapy Assessment/Plan (OT)    Rehab Potential (OT)  good, to achieve stated therapy goals  -BB  good, to achieve stated therapy goals  -BB    Criteria for Skilled Therapeutic Interventions Met (OT)  yes;meets criteria;skilled treatment is necessary  -BB  yes;meets criteria;skilled treatment is necessary  -BB    Therapy Frequency (OT)  other (see comments) 5-7days/wk  -BB  other (see comments) 5-7days/wk  -BB    Row Name 08/10/21 1114 08/10/21 0710       Therapy Plan Review/Discharge Plan (OT)    Anticipated Discharge Disposition (OT)  home with assist  -BB  home with assist  -BB    Row Name 08/10/21 0710          Vital Signs    Pre Systolic BP Rehab  153  -BB     Pre Treatment Diastolic BP  83  -BB     Pretreatment Heart Rate (beats/min)  84  -BB     Pre SpO2 (%)  98  -BB     O2 Delivery Pre Treatment  room air  -BB     Pre Patient Position  Supine  -BB     Row Name 08/10/21 1114 08/10/21 0710       Positioning and Restraints    Pre-Treatment Position  in bed  -BB  in bed  -BB    Post Treatment Position  bed  -BB  bed  -BB    In Bed  fowlers;call light within reach;encouraged to call for assist;exit alarm on  -BB  fowlers;call light within reach;encouraged to call for assist;exit alarm on  -BB      User Key  (r) = Recorded By, (t) = Taken By, (c) = Cosigned By    Initials Name Provider Type    BB Hazel Molina COTA/L Occupational Therapy Assistant        Outcome Measures     Row Name 08/10/21 0710          How much help from another is currently needed...    Putting on and taking off regular lower body clothing?  2  -BB     Bathing (including washing, rinsing, and drying)  3  -BB     Toileting (which includes using toilet bed pan or urinal)  3  -BB     Putting on and taking off regular upper body clothing  3  -BB     Taking care of personal grooming (such as brushing teeth)  3  -BB      Eating meals  3  -BB     AM-PAC 6 Clicks Score (OT)  17  -BB     Row Name 08/10/21 0956          How much help from another person do you currently need...    Turning from your back to your side while in flat bed without using bedrails?  3  -PIEDAD     Moving from lying on back to sitting on the side of a flat bed without bedrails?  3  -PIEDAD     Moving to and from a bed to a chair (including a wheelchair)?  3  -PIEDAD     Standing up from a chair using your arms (e.g., wheelchair, bedside chair)?  3  -PIEDAD     Climbing 3-5 steps with a railing?  3  -PIEDAD     To walk in hospital room?  3  -PIEDAD     AM-PAC 6 Clicks Score (PT)  18  -PIEDAD     Row Name 08/10/21 0956          Functional Assessment    Outcome Measure Options  AM-PAC 6 Clicks Basic Mobility (PT)  -PIEDAD       User Key  (r) = Recorded By, (t) = Taken By, (c) = Cosigned By    Initials Name Provider Type    PIEDAD Vinny Workman, KEARA Physical Therapy Assistant    BB Hazel Molina COTA/L Occupational Therapy Assistant          Occupational Therapy Education                 Title: PT OT SLP Therapies (In Progress)     Topic: Occupational Therapy (In Progress)     Point: ADL training (In Progress)     Description:   Instruct learner(s) on proper safety adaptation and remediation techniques during self care or transfers.   Instruct in proper use of assistive devices.              Learning Progress Summary           Patient Acceptance, E, NR by BB at 8/10/2021 1155                   Point: Home exercise program (Not Started)     Description:   Instruct learner(s) on appropriate technique for monitoring, assisting and/or progressing therapeutic exercises/activities.              Learner Progress:  Not documented in this visit.          Point: Precautions (In Progress)     Description:   Instruct learner(s) on prescribed precautions during self-care and functional transfers.              Learning Progress Summary           Patient Acceptance, E, NR by BB at 8/10/2021 1151     Acceptance, E, VU by AS at 8/9/2021 1431    Comment: role of OT, OT POC, bed mobility, transfer training                   Point: Body mechanics (In Progress)     Description:   Instruct learner(s) on proper positioning and spine alignment during self-care, functional mobility activities and/or exercises.              Learning Progress Summary           Patient Acceptance, E, NR by MARGOTH at 8/10/2021 1155                               User Key     Initials Effective Dates Name Provider Type Discipline    BB 06/16/21 -  Hazel Molina COTA/L Occupational Therapy Assistant OT    AS 03/18/21 -  Jennifer Hwang, OT Occupational Therapist OT              OT Recommendation and Plan  Therapy Frequency (OT): other (see comments) (5-7days/wk)  Plan of Care Review  Plan of Care Reviewed With: patient  Progress: improving  Outcome Summary: Pt educated on home safety/fall prevention. Pt CGA for bed<>toilet t/f. Continue OT POC     Time Calculation:   Time Calculation- OT     Row Name 08/10/21 1342 08/10/21 1159          Time Calculation- OT    OT Start Time  1114  -BB  0710  -BB     OT Stop Time  1139  -BB  0835  -BB     OT Time Calculation (min)  25 min  -BB  85 min  -BB     Total Timed Code Minutes- OT  25 minute(s)  -BB  85 minute(s)  -BB     OT Received On  08/10/21  -BB  08/10/21  -BB        Timed Charges    50614 - OT Self Care/Mgmt Minutes  25  -BB  85  -BB        Total Minutes    Timed Charges Total Minutes  25  -BB  85  -BB      Total Minutes  25  -BB  85  -BB       User Key  (r) = Recorded By, (t) = Taken By, (c) = Cosigned By    Initials Name Provider Type    BB Hazel Molina COTA/L Occupational Therapy Assistant        Therapy Charges for Today     Code Description Service Date Service Provider Modifiers Qty    50762053305 HC OT SELF CARE/MGMT/TRAIN EA 15 MIN 8/10/2021 Hazel Molina COTA/L GO 6    65787717130 HC OT SELF CARE/MGMT/TRAIN EA 15 MIN 8/10/2021 Hazel Molina COTA/L GO 2                Hazel Molina, HARO/DAMIÁN  8/10/2021

## 2021-08-10 NOTE — THERAPY TREATMENT NOTE
Patient Name: Hitesh Rodarte  : 1970    MRN: [id number]                              Today's Date: 8/10/2021       Admit Date: 2021    Visit Dx:     ICD-10-CM ICD-9-CM   1. Chest pain, unspecified type  R07.9 786.50   2. Closed displaced fracture of greater tuberosity of right humerus, initial encounter  S42.251A 812.03   3. Hypokalemia  E87.6 276.8   4. Hypertensive urgency  I16.0 401.9   5. Impaired mobility and ADLs  Z74.09 V49.89    Z78.9    6. Impaired functional mobility, balance, gait, and endurance  Z74.09 V49.89     Patient Active Problem List   Diagnosis   • Chest pain     Past Medical History:   Diagnosis Date   • Alcohol abuse    • Hypertension      History reviewed. No pertinent surgical history.  General Information     Row Name 08/10/21 0710          OT Time and Intention    Document Type  therapy note (daily note)  -BB     Mode of Treatment  individual therapy;occupational therapy  -BB     Row Name 08/10/21 0710          General Information    Patient Profile Reviewed  yes  -BB     Existing Precautions/Restrictions  non-weight bearing NWB RUE; RUE in sling  -BB     Row Name 08/10/21 0710          Cognition    Orientation Status (Cognition)  oriented x 4  -BB     Row Name 08/10/21 0710          Safety Issues, Functional Mobility    Impairments Affecting Function (Mobility)  balance;endurance/activity tolerance;pain;strength  -BB       User Key  (r) = Recorded By, (t) = Taken By, (c) = Cosigned By    Initials Name Provider Type    BB Hazel Molina COTA/L Occupational Therapy Assistant          Mobility/ADL's     Row Name 08/10/21 0710          Bed Mobility    Bed Mobility  supine-sit;sit-supine  -BB     Supine-Sit [place] (Bed Mobility)  standby assist  -BB     Sit-Supine [place] (Bed Mobility)  standby assist  -BB     Bed Mobility, Safety Issues  decreased use of arms for pushing/pulling  -BB     Assistive Device (Bed Mobility)  head of bed elevated  -BB     Row Name  08/10/21 0710          Transfers    Transfers  sit-stand transfer  -BB     Sit-Stand [place] (Transfers)  contact guard  -     Row Name 08/10/21 0710          Functional Mobility    Functional Mobility- Ind. Level  contact guard assist  -     Row Name 08/10/21 0710          Activities of Daily Living    BADL Assessment/Intervention  bathing;upper body dressing;lower body dressing;grooming;toileting  -[facility] Name 08/10/21 0710          Mobility    Extremity Weight-bearing Status  right upper extremity  -BB     Right Upper Extremity (Weight-bearing Status)  non weight-bearing (NWB)  -[facility] Name 08/10/21 0710          Bathing Assessment/Intervention    [place] Level (Bathing)  upper body;minimum assist (75% patient effort);verbal cues;lower body;maximum assist (25% patient effort)  -BB     Position (Bathing)  edge of bed sitting  -[facility] Name 08/10/21 0710          Upper Body Dressing Assessment/Training    [place] Level (Upper Body Dressing)  doff;don;moderate assist (50% patient effort);verbal cues HG  -BB     Position (Upper Body Dressing)  edge of bed sitting  -[facility] Name 08/10/21 0710          Lower Body Dressing Assessment/Training    [place] Level (Lower Body Dressing)  doff;don;socks;dependent (less than 25% patient effort)  -BB     Position (Lower Body Dressing)  edge of bed sitting  -     Row Name 08/10/21 0710          Grooming Assessment/Training    [place] Level (Grooming)  hair care, combing/brushing;shave face;wash face, hands;moderate assist (50% patient effort)  -BB     Assistive Devices (Grooming)  electric razor  -BB     Position (Grooming)  edge of bed sitting  -     Row Name 08/10/21 0710          Toileting Assessment/Training    [place] Level (Toileting)  adjust/manage clothing;standby assist  -BB     Assistive Devices (Toileting)  urinal  -BB     Position (Toileting)  edge of bed sitting  -       User Key  (r) = Recorded By, (t) = Taken  By, (c) = Cosigned By    Initials Name Provider Type    Hazel Wise COTA/L Occupational Therapy Assistant        Obj/Interventions    No documentation.       Goals/Plan     Row Name 08/10/21 0710          Transfer Goal 1 (OT)    Activity/Assistive Device (Transfer Goal 1, OT)  sit-to-stand/stand-to-sit;bed-to-chair/chair-to-bed;toilet  -BB     [place] Level/Cues Needed (Transfer Goal 1, OT)  modified independence  -BB     Time Frame (Transfer Goal 1, OT)  by discharge;long term goal (LTG)  -BB     Progress/Outcome (Transfer Goal 1, OT)  goal not met  -BB     Row Name 08/10/21 0710          Dressing Goal 1 (OT)    Activity/Device (Dressing Goal 1, OT)  dressing skills, all  -BB     [place]/Cues Needed (Dressing Goal 1, OT)  set-up required;supervision required  -BB     Time Frame (Dressing Goal 1, OT)  long term goal (LTG);by discharge  -BB     Progress/Outcome (Dressing Goal 1, OT)  goal not met  -BB     Row Name 08/10/21 0710          Toileting Goal 1 (OT)    Activity/Device (Toileting Goal 1, OT)  toileting skills, all  -BB     [place] Level/Cues Needed (Toileting Goal 1, OT)  modified independence  -BB     Time Frame (Toileting Goal 1, OT)  long term goal (LTG);by discharge  -BB     Progress/Outcome (Toileting Goal 1, OT)  goal not met  -BB       User Key  (r) = Recorded By, (t) = Taken By, (c) = Cosigned By    Initials Name Provider Type    Hazel Wise COTA/L Occupational Therapy Assistant        Clinical Impression     Row Name 08/10/21 0710          Pain Scale: Numbers Pre/Post-Treatment    Pretreatment Pain Rating  3/10  -BB     Posttreatment Pain Rating  2/10  -BB     Pain Location - Side  Right  -BB     Pain Location  shoulder  -BB     Pain Intervention(s)  Repositioned  -BB     Row Name 08/10/21 0710          Plan of Care Review    Plan of Care Reviewed With  patient  -BB     Progress  no change  -BB     Outcome Summary  Pt sitting up in bed and is complaining about  his sling for R shoulder. Pt agrees to OT this am. Pt is Mod A for UB bathing and Max A for UB dressing. Pt is Max A for LB bathing and dependent for doffing/donning socks. Pt is Min A for grooming tasks. Pt requires increased time and effort to complete ADL this am.  -BB     Row Name 08/10/21 0710          Therapy Assessment/Plan (OT)    Rehab Potential (OT)  good, to achieve stated therapy goals  -BB     Criteria for Skilled Therapeutic Interventions Met (OT)  yes;meets criteria;skilled treatment is necessary  -BB     Therapy Frequency (OT)  other (see comments) 5-7days/wk  -BB     Row Name 08/10/21 0710          Therapy Plan Review/Discharge Plan (OT)    Anticipated Discharge Disposition (OT)  home with assist  -BB     Row Name 08/10/21 0710          Vital Signs    Pre Systolic BP Rehab  153  -BB     Pre Treatment Diastolic BP  83  -BB     Pretreatment Heart Rate (beats/min)  84  -BB     Pre SpO2 (%)  98  -BB     O2 Delivery Pre Treatment  room air  -BB     Pre Patient Position  Supine  -BB     Row Name 08/10/21 0710          Positioning and Restraints    Pre-Treatment Position  in bed  -BB     Post Treatment Position  bed  -BB     In Bed  fowlers;call light within reach;encouraged to call for assist;exit alarm on  -BB       User Key  (r) = Recorded By, (t) = Taken By, (c) = Cosigned By    Initials Name Provider Type    Hazel Wise COTA/L Occupational Therapy Assistant        Outcome Measures     Row Name 08/10/21 0710          How much help from another is currently needed...    Putting on and taking off regular lower body clothing?  2  -BB     Bathing (including washing, rinsing, and drying)  3  -BB     Toileting (which includes using toilet bed pan or urinal)  3  -BB     Putting on and taking off regular upper body clothing  3  -BB     Taking care of personal grooming (such as brushing teeth)  3  -BB     Eating meals  3  -BB     AM-PAC 6 Clicks Score (OT)  17  -BB       User Key  (r) = Recorded By,  (t) = Taken By, (c) = Cosigned By    Initials Name Provider Type    Hazel Wise COTA/L Occupational Therapy Assistant          Occupational Therapy Education                 Title: PT OT SLP Therapies (In Progress)     Topic: Occupational Therapy (In Progress)     Point: ADL training (In Progress)     Description:   Instruct learner(s) on proper safety adaptation and remediation techniques during self care or transfers.   Instruct in proper use of assistive devices.              Learning Progress Summary           Patient Acceptance, E, NR by BB at 8/10/2021 1155                   Point: Home exercise program (Not Started)     Description:   Instruct learner(s) on appropriate technique for monitoring, assisting and/or progressing therapeutic exercises/activities.              Learner Progress:  Not documented in this visit.          Point: Precautions (In Progress)     Description:   Instruct learner(s) on prescribed precautions during self-care and functional transfers.              Learning Progress Summary           Patient Acceptance, E, NR by BB at 8/10/2021 1155    Acceptance, E, VU by AS at 8/9/2021 1431    Comment: role of OT, OT POC, bed mobility, transfer training                   Point: Body mechanics (In Progress)     Description:   Instruct learner(s) on proper positioning and spine alignment during self-care, functional mobility activities and/or exercises.              Learning Progress Summary           Patient Acceptance, E, NR by BB at 8/10/2021 1155                               User Key     Initials Effective Dates Name Provider Type Discipline    BB 06/16/21 -  aHzel Molina COTA/L Occupational Therapy Assistant OT    AS 03/18/21 -  Jennifer Hwang OT Occupational Therapist OT              OT Recommendation and Plan  Therapy Frequency (OT): other (see comments) (5-7days/wk)  Plan of Care Review  Plan of Care Reviewed With: patient  Progress: no change  Outcome Summary: Pt  sitting up in bed and is complaining about his sling for R shoulder. Pt agrees to OT this am. Pt is Mod A for UB bathing and Max A for UB dressing. Pt is Max A for LB bathing and dependent for doffing/donning socks. Pt is Min A for grooming tasks. Pt requires increased time and effort to complete ADL this am.     Time Calculation:   Time Calculation- OT     Row Name 08/10/21 1159             Time Calculation- OT    OT Start Time  0710  -BB      OT Stop Time  0835  -BB      OT Time Calculation (min)  85 min  -BB      Total Timed Code Minutes- OT  85 minute(s)  -BB      OT Received On  08/10/21  -BB         Timed Charges    58511 - OT Self Care/Mgmt Minutes  85  -BB         Total Minutes    Timed Charges Total Minutes  85  -BB       Total Minutes  85  -BB        User Key  (r) = Recorded By, (t) = Taken By, (c) = Cosigned By    Initials Name Provider Type    BB Hazel Molina COTA/L Occupational Therapy Assistant        Therapy Charges for Today     Code Description Service Date Service Provider Modifiers Qty    [id number] HC OT SELF CARE/MGMT/TRAIN EA 15 MIN 8/10/2021 Hazel Molina COTA/L GO 6               TAHIR Ragland/DAMIÁN  8/10/2021

## 2021-08-10 NOTE — PLAN OF CARE
Goal Outcome Evaluation:           Progress: no change  Outcome Summary: VSS; pain controlled; resting inbetwen care; will continue to monitor

## 2021-08-10 NOTE — PLAN OF CARE
Problem: Adult Inpatient Plan of Care  Goal: Plan of Care Review  Flowsheets  Taken 8/10/2021 1155  Plan of Care Reviewed With: patient  Taken 8/10/2021 0710  Progress: no change  Plan of Care Reviewed With: patient  Outcome Summary: Pt sitting up in bed and is complaining about his sling for R shoulder. Pt agrees to OT this am. Pt is Mod A for UB bathing and Max A for UB dressing. Pt is Max A for LB bathing and dependent for doffing/donning socks. Pt is Min A for grooming tasks. Pt requires increased time and effort to complete ADL this am.   Goal Outcome Evaluation:  Plan of Care Reviewed With: patient        Progress: no change  Outcome Summary: Pt sitting up in bed and is complaining about his sling for R shoulder. Pt agrees to OT this am. Pt is Mod A for UB bathing and Max A for UB dressing. Pt is Max A for LB bathing and dependent for doffing/donning socks. Pt is Min A for grooming tasks. Pt requires increased time and effort to complete ADL this am.

## 2021-08-10 NOTE — PROGRESS NOTES
[facility] Medicine Services  INPATIENT PROGRESS NOTE    Length of Stay: 0  Date of Admission: 8/8/2021  Primary Care Physician: Provider, No Known    Subjective   Chief Complaint: admitted with etoh intoxication and chest pain and elevated bp and concerned about his life and being homeless and needing rehab and needing help   HPI:      He has been abusing etoh all of his life   Family wants him to get help and get rehab.   Get to a point of a real life and existence  He has elevated bp   Cough   Fatigue   Weakness   Lack of place to live.     Review of Systems   Constitutional: Positive for activity change.   HENT: Positive for congestion.    Eyes: Negative.    Respiratory: Positive for cough.    Cardiovascular: Positive for palpitations.   Gastrointestinal: Negative.    Endocrine: Negative.    Genitourinary: Negative.    Musculoskeletal: Positive for arthralgias.   Skin: Negative.    Allergic/Immunologic: Negative.    Neurological: Negative.    Hematological: Negative.    Psychiatric/Behavioral: Negative.       All pertinent negatives and positives are as above. All other systems have been reviewed and are negative unless otherwise stated.     Objective    Temp:  [96.9 °F (36.1 °C)-99.3 °F (37.4 °C)] 96.9 °F (36.1 °C)  Heart Rate:  [71-90] 77  Resp:  [16-19] 18  BP: (122-178)/() 122/84    Physical Exam  Vitals and nursing note reviewed.   Constitutional:       General: He is in acute distress.   HENT:      Head: Normocephalic and atraumatic.      Nose: Nose normal.   Eyes:      Conjunctiva/sclera: Conjunctivae normal.   Cardiovascular:      Pulses: Normal pulses.   Abdominal:      General: Abdomen is flat. Bowel sounds are normal.   Musculoskeletal:         General: Normal range of motion.      Cervical back: Normal range of motion.   Neurological:      General: No focal deficit present.   Psychiatric:         Mood and Affect: Mood normal.             Results  Review:  I have reviewed the labs, radiology results, and diagnostic studies.    Laboratory Data:   Results from last 7 days   Lab Units 08/09/21  0547 08/08/21  1713 08/08/21  1320 08/05/21  1325 08/05/21  1325   SODIUM mmol/L 131*  --  133*  --  130*   POTASSIUM mmol/L 3.7 2.8* 2.7*   < > 2.9*   CHLORIDE mmol/L 95*  --  90*  --  87*   CO2 mmol/L 27.0  --  28.0  --  26.0   BUN mg/dL 5*  --  7  --  5*   CREATININE mg/dL 0.56*  --  0.58*  --  0.65*   GLUCOSE mg/dL 111*  --  129*  --  106*   CALCIUM mg/dL 9.6  --  10.3  --  9.9   BILIRUBIN mg/dL 1.4*  --  2.0*  --  1.8*   ALK PHOS U/L 103  --  130*  --  118*   ALT (SGPT) U/L 101*  --  148*  --  99*   AST (SGOT) U/L 142*  --  247*  --  177*   ANION GAP mmol/L 9.0  --  15.0  --  17.0*    < > = values in this interval not displayed.     Estimated Creatinine Clearance: 185 mL/min (A) (by C-G formula based on SCr of 0.56 mg/dL (L)).  Results from last 7 days   Lab Units 08/09/21  0547 08/08/21  1456 08/05/21  1325   MAGNESIUM mg/dL 1.2* 1.0* 1.5*         Results from last 7 days   Lab Units 08/09/21  0547 08/08/21  1320 08/05/21  1324   WBC 10*3/mm3 6.23 8.16 12.17*   HEMOGLOBIN g/dL 14.0 15.5 17.0   HEMATOCRIT % 40.1 44.3 47.6   PLATELETS 10*3/mm3 98* 93* 76*     Results from last 7 days   Lab Units 08/08/21  1320   INR  0.94       Culture Data:   No results found for: BLOODCX  No results found for: URINECX  No results found for: RESPCX  No results found for: WOUNDCX  No results found for: STOOLCX  No components found for: BODYFLD    Radiology Data:   Imaging Results (Last 24 Hours)     ** No results found for the last 24 hours. **          I have reviewed the patient's current medications.     Assessment/Plan     Active Hospital Problems    Diagnosis    • Chest pain        Plan:  Patient admitted due etoh intoxication and is currently homeless and wants to go to rehab and his family wants him to go to rehab.     He is in agreement with transfer to rehab.     He is  dehydratied    And agitated   And anxious     Elevated bp     'has been using etoh most of his life.       The patient was evaluated during the global COVID-19 pandemic, and the diagnosis was suspected/considered upon their initial presentation.  Evaluation, treatment, and testing were consistent with current guidelines for patients who present with complaints or symptoms that may be related to COVID-19.    I confirmed that the patient's Advance Care Plan is present, code status is documented, or surrogate decision maker is listed in the patient's medical record.       Discharge Planning: I expect patient to be discharged to 48 hours     Isabel Kerr MD

## 2021-08-10 NOTE — PLAN OF CARE
Goal Outcome Evaluation:  Plan of Care Reviewed With: patient        Progress: improving  Problem: Adult Inpatient Plan of Care  Goal: Plan of Care Review  Outcome: Ongoing, Progressing  Flowsheets (Taken 8/10/2021 1325)  Progress: improving  Plan of Care Reviewed With: patient  Outcome Summary: pts BP elevated upon entry but nsg approves of PT tx. pt assisted w/ bed mobility and BR t/f that requires CGA. pts BP much elevated post BR t/f and tx was ended. pt having pain. no new goals met at this time. pt would continue to benfit from PT services.

## 2021-08-10 NOTE — THERAPY TREATMENT NOTE
Acute Care - Physical Therapy Treatment Note  [facility]     Patient Name: Hitesh Rodarte  : 1970  MRN: [id number]  Today's Date: 8/10/2021      Visit Dx:     ICD-10-CM ICD-9-CM   1. Chest pain, unspecified type  R07.9 786.50   2. Closed displaced fracture of greater tuberosity of right humerus, initial encounter  S42.251A 812.03   3. Hypokalemia  E87.6 276.8   4. Hypertensive urgency  I16.0 401.9   5. Impaired mobility and ADLs  Z74.09 V49.89    Z78.9    6. Impaired functional mobility, balance, gait, and endurance  Z74.09 V49.89     Patient Active Problem List   Diagnosis   • Chest pain     Past Medical History:   Diagnosis Date   • Alcohol abuse    • Hypertension      History reviewed. No pertinent surgical history.     PT Assessment (last 12 hours)      PT Evaluation and Treatment     Row Name 08/10/21 0956          Physical Therapy Time and Intention    Document Type  therapy note (daily note)  -     Mode of Treatment  physical therapy  -     Comment  nsg approves tx despite high BP. pt assisted with BR t/f per pt request. no further tx due to elevated BP.   -     Row Name 08/10/21 0956          General Information    Patient Profile Reviewed  yes  -     Existing Precautions/Restrictions  non-weight bearing NWB RUE; RUE in sling  -     Row Name 08/10/21 0956          Cognition    Affect/Mental Status (Cognitive)  WFL  -     Orientation Status (Cognition)  oriented x 4  -     Personal Safety Interventions  fall prevention program maintained;gait belt;muscle strengthening facilitated;nonskid shoes/slippers when out of bed;supervised activity  -     Row Name 08/10/21 0956          Pain    Additional Documentation  Pain Scale: Numbers Pre/Post-Treatment (Group)  -     Row Name 08/10/21 1000 08/10/21 0956       Pain Scale: Numbers Pre/Post-Treatment    Pretreatment Pain Rating  --  -PIEDAD  10  -    Posttreatment Pain Rating  --  2/10  -    Pain Location - Side  --  -  Right   -    Pain Location  --  -PIEDAD  shoulder  -    Row Name 08/10/21 0956          Pain Scale: Word Pre/Post-Treatment    Pain Intervention(s)  Repositioned applied sling  -     Row Name 08/10/21 0956          Mobility    Extremity Weight-bearing Status  right upper extremity  -     Right Upper Extremity (Weight-bearing Status)  non weight-bearing (NWB)  -     Row Name 08/10/21 0956          Bed Mobility    Bed Mobility  supine-sit;sit-supine  -     Supine-Sit [place] (Bed Mobility)  standby assist  -     Sit-Supine [place] (Bed Mobility)  standby assist  -     Assistive Device (Bed Mobility)  bed rails;head of bed elevated  -     Row Name 08/10/21 0956          Transfers    Transfers  sit-stand transfer;stand-sit transfer;toilet transfer  -     Comment (Transfers)  no AD  -PIEDAD     Sit-Stand [place] (Transfers)  contact guard  -     Stand-Sit [place] (Transfers)  contact guard  -     [place] Level (Toilet Transfer)  contact guard  -[facility] Name 08/10/21 0956          Toilet Transfer    Type (Toilet Transfer)  sit-stand;stand-sit  -[facility] Name 08/10/21 0956          Gait/Stairs (Locomotion)    Gait/Stairs Locomotion  gait/ambulation independence;gait/ambulation assistive device;distance ambulated;gait pattern;gait deviations;maintains weight-bearing status;stairs negotiation  -     [place] Level (Gait)  contact guard  -     Assistive Device (Gait)  walker, front-wheeled  -     Distance in Feet (Gait)  6 ft x2  -     Pattern (Gait)  step-through  -     Deviations/Abnormal Patterns (Gait)  gait speed decreased pt guarded and SOA w/ gait. pt c/o pain around trunk.   -     Row Name 08/10/21 0956          Safety Issues, Functional Mobility    Impairments Affecting Function (Mobility)  balance;endurance/activity tolerance;pain;strength  -[facility] Name 08/10/21 0956          Vital Signs    Pre Systolic BP Rehab  (S) 156 nsg notified who states to continue w/  PT tx.   -PIEDAD     Pre Treatment Diastolic BP  (S) 108  -PIEDAD     Post Systolic BP Rehab  (S) 170 no further tx.   -PIEDAD     Post Treatment Diastolic BP  (S) 110  -PIEDAD     Pretreatment Heart Rate (beats/min)  86  -PIEDAD     Posttreatment Heart Rate (beats/min)  90  -PIEDAD     Pre SpO2 (%)  97  -PIEDAD     O2 Delivery Pre Treatment  room air  -PIEDAD     Post SpO2 (%)  97  -PIEDAD     O2 Delivery Post Treatment  room air  -PIEDAD     Pre Patient Position  Supine  -PIEDAD     Post Patient Position  Supine  -PIEDAD     Row Name 08/10/21 0956          Positioning and Restraints    Pre-Treatment Position  in bed  -PIEDAD     Post Treatment Position  bed  -PIEDAD     In Bed  fowlers;encouraged to call for assist;call light within reach;exit alarm on  -PIEDAD       User Key  (r) = Recorded By, (t) = Taken By, (c) = Cosigned By    Initials Name Provider Type    PIEDAD Vinny Workman PTA Physical Therapy Assistant        Physical Therapy Education                 Title: PT OT SLP Therapies (In Progress)     Topic: Physical Therapy (In Progress)     Point: Mobility training (In Progress)     Learning Progress Summary           Patient Acceptance, E, NR by PIEDAD at 8/10/2021 1324    Acceptance, E, VU by  at 8/9/2021 1437    Comment: Role of PT, POC, use of gait belt                   Point: Home exercise program (Not Started)     Learner Progress:  Not documented in this visit.          Point: Body mechanics (Not Started)     Learner Progress:  Not documented in this visit.          Point: Precautions (Done)     Learning Progress Summary           Patient Acceptance, E, VU by RONNY at 8/9/2021 1437    Comment: Role of PT, POC, use of gait belt                               User Key     Initials Effective Dates Name Provider Type Discipline     06/16/21 -  Vinny Workman PTA Physical Therapy Assistant PT    RONNY 06/16/21 -  Mana Steve PT Physical Therapist PT              PT Recommendation and Plan     Plan of Care Reviewed With: patient  Progress: improving  Outcome  Summary: pts BR elevated upon entry but nsg approves of PT tx. pt assisted w/ bed mobility and BR t/f that requires CGA. pts BR much elevated post BR t/f and tx was ended. pt having pain. no new goals met at this time. pt would continue to benfit from PT services.  Outcome Measures     Row Name 08/10/21 0956             How much help from another person do you currently need...    Turning from your back to your side while in flat bed without using bedrails?  3  -PIEDAD      Moving from lying on back to sitting on the side of a flat bed without bedrails?  3  -PIEDAD      Moving to and from a bed to a chair (including a wheelchair)?  3  -PIEDAD      Standing up from a chair using your arms (e.g., wheelchair, bedside chair)?  3  -PIEDAD      Climbing 3-5 steps with a railing?  3  -PIEDAD      To walk in hospital room?  3  -PIEDAD      AM-PAC 6 Clicks Score (PT)  18  -PIEDAD         Functional Assessment    Outcome Measure Options  AM-PAC 6 Clicks Basic Mobility (PT)  -PIEDAD        User Key  (r) = Recorded By, (t) = Taken By, (c) = Cosigned By    Initials Name Provider Type    Vinny Mills PTA Physical Therapy Assistant           Time Calculation:   PT Charges     Row Name 08/10/21 1326             Time Calculation    Start Time  0956  -PIEDAD      Stop Time  1020  -PIEDAD      Time Calculation (min)  24 min  -PIEDAD         Time Calculation- PT    Total Timed Code Minutes- PT  24 minute(s)  -PIEDDA         Timed Charges    67241 - PT Therapeutic Activity Minutes  24  -PIEDAD         Total Minutes    Timed Charges Total Minutes  24  -PIEDAD       Total Minutes  24  -PIEDAD        User Key  (r) = Recorded By, (t) = Taken By, (c) = Cosigned By    Initials Name Provider Type    Vinny Mills PTA Physical Therapy Assistant        Therapy Charges for Today     Code Description Service Date Service Provider Modifiers Qty    82426624700 HC PT THERAPEUTIC ACT EA 15 MIN 8/10/2021 Vinny Workman PTA GP 2          PT G-Codes  Outcome Measure Options: AM-PAC 6 Clicks  Basic Mobility (PT)  AM-PAC 6 Clicks Score (PT): 18  AM-PAC 6 Clicks Score (OT): 17    Vinny Workman, PTA  8/10/2021

## 2021-08-11 LAB
ALBUMIN SERPL-MCNC: 3.2 G/DL (ref 3.5–5.2)
ALBUMIN/GLOB SERPL: 1.1 G/DL
ALP SERPL-CCNC: 97 U/L (ref 39–117)
ALT SERPL W P-5'-P-CCNC: 77 U/L (ref 1–41)
AMPHET+METHAMPHET UR QL: NEGATIVE
AMPHETAMINES UR QL: NEGATIVE
ANION GAP SERPL CALCULATED.3IONS-SCNC: 8 MMOL/L (ref 5–15)
AST SERPL-CCNC: 91 U/L (ref 1–40)
BARBITURATES UR QL SCN: NEGATIVE
BENZODIAZ UR QL SCN: NEGATIVE
BILIRUB SERPL-MCNC: 0.9 MG/DL (ref 0–1.2)
BILIRUB UR QL STRIP: NEGATIVE
BUN SERPL-MCNC: 5 MG/DL (ref 6–20)
BUN/CREAT SERPL: 8.9 (ref 7–25)
BUPRENORPHINE SERPL-MCNC: NEGATIVE NG/ML
CALCIUM SPEC-SCNC: 9.2 MG/DL (ref 8.6–10.5)
CANNABINOIDS SERPL QL: NEGATIVE
CHLORIDE SERPL-SCNC: 99 MMOL/L (ref 98–107)
CLARITY UR: CLEAR
CO2 SERPL-SCNC: 26 MMOL/L (ref 22–29)
COCAINE UR QL: NEGATIVE
COLOR UR: YELLOW
CREAT SERPL-MCNC: 0.56 MG/DL (ref 0.76–1.27)
DEPRECATED RDW RBC AUTO: 54.4 FL (ref 37–54)
ERYTHROCYTE [DISTWIDTH] IN BLOOD BY AUTOMATED COUNT: 14.9 % (ref 12.3–15.4)
GFR SERPL CREATININE-BSD FRML MDRD: >150 ML/MIN/1.73
GLOBULIN UR ELPH-MCNC: 2.8 GM/DL
GLUCOSE SERPL-MCNC: 91 MG/DL (ref 65–99)
GLUCOSE UR STRIP-MCNC: NEGATIVE MG/DL
HCT VFR BLD AUTO: 42.4 % (ref 37.5–51)
HGB BLD-MCNC: 14.4 G/DL (ref 13–17.7)
HGB UR QL STRIP.AUTO: NEGATIVE
KETONES UR QL STRIP: NEGATIVE
LEUKOCYTE ESTERASE UR QL STRIP.AUTO: NEGATIVE
MAGNESIUM SERPL-MCNC: 1.2 MG/DL (ref 1.6–2.6)
MCH RBC QN AUTO: 33.7 PG (ref 26.6–33)
MCHC RBC AUTO-ENTMCNC: 34 G/DL (ref 31.5–35.7)
MCV RBC AUTO: 99.3 FL (ref 79–97)
METHADONE UR QL SCN: NEGATIVE
NITRITE UR QL STRIP: NEGATIVE
OPIATES UR QL: NEGATIVE
OXYCODONE UR QL SCN: NEGATIVE
PCP UR QL SCN: NEGATIVE
PH UR STRIP.AUTO: 6.5 [PH] (ref 5–9)
PLATELET # BLD AUTO: 140 10*3/MM3 (ref 140–450)
PMV BLD AUTO: 9.7 FL (ref 6–12)
POTASSIUM SERPL-SCNC: 3.9 MMOL/L (ref 3.5–5.2)
PROPOXYPH UR QL: NEGATIVE
PROT SERPL-MCNC: 6 G/DL (ref 6–8.5)
PROT UR QL STRIP: NEGATIVE
RBC # BLD AUTO: 4.27 10*6/MM3 (ref 4.14–5.8)
SARS-COV-2 RNA PNL SPEC NAA+PROBE: NOT DETECTED
SODIUM SERPL-SCNC: 133 MMOL/L (ref 136–145)
SP GR UR STRIP: 1.01 (ref 1–1.03)
TRICYCLICS UR QL SCN: NEGATIVE
TSH SERPL DL<=0.05 MIU/L-ACNC: 4.57 UIU/ML (ref 0.27–4.2)
UROBILINOGEN UR QL STRIP: ABNORMAL
WBC # BLD AUTO: 6.77 10*3/MM3 (ref 3.4–10.8)

## 2021-08-11 PROCEDURE — 83735 ASSAY OF MAGNESIUM: CPT | Performed by: HOSPITALIST

## 2021-08-11 PROCEDURE — 87635 SARS-COV-2 COVID-19 AMP PRB: CPT | Performed by: INTERNAL MEDICINE

## 2021-08-11 PROCEDURE — 97535 SELF CARE MNGMENT TRAINING: CPT

## 2021-08-11 PROCEDURE — 84443 ASSAY THYROID STIM HORMONE: CPT | Performed by: INTERNAL MEDICINE

## 2021-08-11 PROCEDURE — 25010000002 HEPARIN (PORCINE) PER 1000 UNITS: Performed by: INTERNAL MEDICINE

## 2021-08-11 PROCEDURE — 25010000002 MAGNESIUM SULFATE 2 GM/50ML SOLUTION: Performed by: INTERNAL MEDICINE

## 2021-08-11 PROCEDURE — 96367 TX/PROPH/DG ADDL SEQ IV INF: CPT

## 2021-08-11 PROCEDURE — 93005 ELECTROCARDIOGRAM TRACING: CPT | Performed by: INTERNAL MEDICINE

## 2021-08-11 PROCEDURE — 80053 COMPREHEN METABOLIC PANEL: CPT | Performed by: INTERNAL MEDICINE

## 2021-08-11 PROCEDURE — 96372 THER/PROPH/DIAG INJ SC/IM: CPT

## 2021-08-11 PROCEDURE — G0378 HOSPITAL OBSERVATION PER HR: HCPCS

## 2021-08-11 PROCEDURE — 97530 THERAPEUTIC ACTIVITIES: CPT

## 2021-08-11 PROCEDURE — 97116 GAIT TRAINING THERAPY: CPT

## 2021-08-11 PROCEDURE — 96366 THER/PROPH/DIAG IV INF ADDON: CPT

## 2021-08-11 PROCEDURE — 81003 URINALYSIS AUTO W/O SCOPE: CPT | Performed by: INTERNAL MEDICINE

## 2021-08-11 PROCEDURE — 80306 DRUG TEST PRSMV INSTRMNT: CPT | Performed by: INTERNAL MEDICINE

## 2021-08-11 PROCEDURE — 93010 ELECTROCARDIOGRAM REPORT: CPT | Performed by: INTERNAL MEDICINE

## 2021-08-11 PROCEDURE — 96361 HYDRATE IV INFUSION ADD-ON: CPT

## 2021-08-11 PROCEDURE — 85027 COMPLETE CBC AUTOMATED: CPT | Performed by: INTERNAL MEDICINE

## 2021-08-11 RX ADMIN — MAGNESIUM SULFATE HEPTAHYDRATE 2 G: 40 INJECTION, SOLUTION INTRAVENOUS at 10:19

## 2021-08-11 RX ADMIN — MAGNESIUM SULFATE HEPTAHYDRATE 2 G: 40 INJECTION, SOLUTION INTRAVENOUS at 14:23

## 2021-08-11 RX ADMIN — METOPROLOL TARTRATE 75 MG: 50 TABLET, FILM COATED ORAL at 20:11

## 2021-08-11 RX ADMIN — THERA TABS 1 TABLET: TAB at 09:28

## 2021-08-11 RX ADMIN — HEPARIN SODIUM 5000 UNITS: 5000 INJECTION INTRAVENOUS; SUBCUTANEOUS at 09:28

## 2021-08-11 RX ADMIN — SODIUM CHLORIDE 125 ML/HR: 9 INJECTION, SOLUTION INTRAVENOUS at 05:49

## 2021-08-11 RX ADMIN — MAGNESIUM SULFATE HEPTAHYDRATE 2 G: 40 INJECTION, SOLUTION INTRAVENOUS at 12:43

## 2021-08-11 RX ADMIN — ASPIRIN 81 MG: 81 TABLET, FILM COATED ORAL at 09:28

## 2021-08-11 RX ADMIN — FOLIC ACID 1 MG: 1 TABLET ORAL at 09:28

## 2021-08-11 RX ADMIN — SODIUM CHLORIDE, PRESERVATIVE FREE 10 ML: 5 INJECTION INTRAVENOUS at 09:29

## 2021-08-11 RX ADMIN — THIAMINE HCL TAB 100 MG 100 MG: 100 TAB at 09:28

## 2021-08-11 RX ADMIN — METOPROLOL TARTRATE 50 MG: 50 TABLET, FILM COATED ORAL at 09:28

## 2021-08-11 NOTE — THERAPY TREATMENT NOTE
Patient Name: Hitesh Rodarte  : 1970    MRN: [id number]                              Today's Date: 2021       Admit Date: 2021    Visit Dx:     ICD-10-CM ICD-9-CM   1. Chest pain, unspecified type  R07.9 786.50   2. Closed displaced fracture of greater tuberosity of right humerus, initial encounter  S42.251A 812.03   3. Hypokalemia  E87.6 276.8   4. Hypertensive urgency  I16.0 401.9   5. Impaired mobility and ADLs  Z74.09 V49.89    Z78.9    6. Impaired functional mobility, balance, gait, and endurance  Z74.09 V49.89     Patient Active Problem List   Diagnosis   • Chest pain     Past Medical History:   Diagnosis Date   • Alcohol abuse    • Hypertension      History reviewed. No pertinent surgical history.  General Information     Row Name 21 1015          OT Time and Intention    Document Type  therapy note (daily note)  -BB     Mode of Treatment  individual therapy;occupational therapy  -BB     Row Name 21 1015          General Information    Patient Profile Reviewed  yes  -BB     Existing Precautions/Restrictions  non-weight bearing NWB RUE; RUE in sling  -BB     Row Name 21 1015          Cognition    Orientation Status (Cognition)  oriented x 4  -BB     Row Name 21 1015          Safety Issues, Functional Mobility    Impairments Affecting Function (Mobility)  balance;endurance/activity tolerance;pain;strength  -BB       User Key  (r) = Recorded By, (t) = Taken By, (c) = Cosigned By    Initials Name Provider Type    BB Hazel Molina COTA/L Occupational Therapy Assistant          Mobility/ADL's     Row Name 21 1015          Bed Mobility    Bed Mobility  supine-sit;sit-supine  -BB     Supine-Sit [place] (Bed Mobility)  modified independence  -BB     Sit-Supine [place] (Bed Mobility)  modified independence  -BB     Bed Mobility, Safety Issues  decreased use of arms for pushing/pulling  -BB     Assistive Device (Bed Mobility)  bed rails;head of bed  elevated  -BB     Row Name 08/11/21 1015          Activities of Daily Living    BADL Assessment/Intervention  lower body dressing  -BB     Row Name 08/11/21 1015          Mobility    Extremity Weight-bearing Status  right upper extremity  -BB     Right Upper Extremity (Weight-bearing Status)  non weight-bearing (NWB)  -BB     Row Name 08/11/21 1015          Upper Body Dressing Assessment/Training    [place] Level (Upper Body Dressing)  -- HG  -BB     Row Name 08/11/21 1015          Lower Body Dressing Assessment/Training    [place] Level (Lower Body Dressing)  doff;don;socks;maximum assist (25% patient effort)  -BB     Position (Lower Body Dressing)  edge of bed sitting  -BB     Row Name 08/11/21 1015          Grooming Assessment/Training    [place] Level (Grooming)  wash face, hands;hair care, combing/brushing;oral care regimen;minimum assist (75% patient effort) shampoo hair-Min A  -BB     Position (Grooming)  edge of bed sitting  -BB       User Key  (r) = Recorded By, (t) = Taken By, (c) = Cosigned By    Initials Name Provider Type    BB Hazel Molina, TAHIR/L Occupational Therapy Assistant        Obj/Interventions    No documentation.       Goals/Plan     Row Name 08/11/21 1015          Transfer Goal 1 (OT)    Activity/Assistive Device (Transfer Goal 1, OT)  sit-to-stand/stand-to-sit;bed-to-chair/chair-to-bed;toilet  -BB     [place] Level/Cues Needed (Transfer Goal 1, OT)  modified independence  -BB     Time Frame (Transfer Goal 1, OT)  by discharge;long term goal (LTG)  -BB     Progress/Outcome (Transfer Goal 1, OT)  goal not met  -BB     [facility] Name 08/11/21 1015          Dressing Goal 1 (OT)    Activity/Device (Dressing Goal 1, OT)  dressing skills, all  -BB     [place]/Cues Needed (Dressing Goal 1, OT)  set-up required;supervision required  -BB     Time Frame (Dressing Goal 1, OT)  long term goal (LTG);by discharge  -BB     Progress/Outcome (Dressing Goal 1, OT)  goal not met  -BB      Row Name 08/11/21 1015          Toileting Goal 1 (OT)    Activity/Device (Toileting Goal 1, OT)  toileting skills, all  -BB     [place] Level/Cues Needed (Toileting Goal 1, OT)  modified independence  -BB     Time Frame (Toileting Goal 1, OT)  long term goal (LTG);by discharge  -BB     Progress/Outcome (Toileting Goal 1, OT)  goal not met  -BB       User Key  (r) = Recorded By, (t) = Taken By, (c) = Cosigned By    Initials Name Provider Type    BB Hazel Molina COTA/L Occupational Therapy Assistant        Clinical Impression     Row Name 08/11/21 1015          Pain Scale: Numbers Pre/Post-Treatment    Pretreatment Pain Rating  2/10  -BB     Posttreatment Pain Rating  2/10  -BB     Pain Location - Side  Right  -BB     Pain Location - Orientation  upper  -BB     Pain Location  extremity  -BB     Pain Intervention(s)  Repositioned  -BB     Row Name 08/11/21 1015          Plan of Care Review    Plan of Care Reviewed With  patient  -BB     Progress  improving  -BB     Outcome Summary  Pt agrees to OT this am. Pt is Mod I for supine<>sit EOB. Pt is Min A for grooming activities and Max A for doffing/donning socks. Pt has already had a bath today. Continue OT POC  -BB     Row Name 08/11/21 1015          Therapy Assessment/Plan (OT)    Rehab Potential (OT)  good, to achieve stated therapy goals  -BB     Criteria for Skilled Therapeutic Interventions Met (OT)  yes;meets criteria;skilled treatment is necessary  -BB     Therapy Frequency (OT)  other (see comments) 5-7days/wk  -BB     Row Name 08/11/21 1015          Therapy Plan Review/Discharge Plan (OT)    Anticipated Discharge Disposition (OT)  home with assist  -BB     Row Name 08/11/21 1015          Vital Signs    Pre Systolic BP Rehab  155  -BB     Pre Treatment Diastolic BP  96  -BB     Pretreatment Heart Rate (beats/min)  73  -BB     Pre SpO2 (%)  99  -BB     O2 Delivery Pre Treatment  room air  -BB     Post SpO2 (%)  97  -BB     O2 Delivery Post  Treatment  room air  -BB     Pre Patient Position  Supine  -BB     Post Patient Position  Supine  -BB     Row Name 08/11/21 1015          Positioning and Restraints    Pre-Treatment Position  in bed  -BB     Post Treatment Position  bed  -BB     In Bed  fowlers;call light within reach;encouraged to call for assist;exit alarm on  -BB       User Key  (r) = Recorded By, (t) = Taken By, (c) = Cosigned By    Initials Name Provider Type    Hazel Wise COTA/L Occupational Therapy Assistant        Outcome Measures     Row Name 08/11/21 1015          How much help from another is currently needed...    Putting on and taking off regular lower body clothing?  2  -BB     Bathing (including washing, rinsing, and drying)  2  -BB     Toileting (which includes using toilet bed pan or urinal)  2  -BB     Putting on and taking off regular upper body clothing  2  -BB     Taking care of personal grooming (such as brushing teeth)  3  -BB     Eating meals  3  -BB     AM-PAC 6 Clicks Score (OT)  14  -BB       User Key  (r) = Recorded By, (t) = Taken By, (c) = Cosigned By    Initials Name Provider Type    Hazel Wise COTA/L Occupational Therapy Assistant          Occupational Therapy Education                 Title: PT OT SLP Therapies (In Progress)     Topic: Occupational Therapy (In Progress)     Point: ADL training (In Progress)     Description:   Instruct learner(s) on proper safety adaptation and remediation techniques during self care or transfers.   Instruct in proper use of assistive devices.              Learning Progress Summary           Patient Acceptance, E, NR by BB at 8/11/2021 1343    Acceptance, E, NR by BB at 8/10/2021 1155                   Point: Home exercise program (Not Started)     Description:   Instruct learner(s) on appropriate technique for monitoring, assisting and/or progressing therapeutic exercises/activities.              Learner Progress:  Not documented in this visit.           Point: Precautions (In Progress)     Description:   Instruct learner(s) on prescribed precautions during self-care and functional transfers.              Learning Progress Summary           Patient Acceptance, E, NR by BB at 8/10/2021 1155    Acceptance, E, VU by AS at 8/9/2021 1431    Comment: role of OT, OT POC, bed mobility, transfer training                   Point: Body mechanics (In Progress)     Description:   Instruct learner(s) on proper positioning and spine alignment during self-care, functional mobility activities and/or exercises.              Learning Progress Summary           Patient Acceptance, E, NR by BB at 8/11/2021 1343    Acceptance, E, NR by BB at 8/10/2021 1155                               User Key     Initials Effective Dates Name Provider Type Discipline    BB 06/16/21 -  Hazel Molina COTA/L Occupational Therapy Assistant OT    AS 03/18/21 -  Jennifer Hwang, OT Occupational Therapist OT              OT Recommendation and Plan  Therapy Frequency (OT): other (see comments) (5-7days/wk)  Plan of Care Review  Plan of Care Reviewed With: patient  Progress: improving  Outcome Summary: Pt agrees to OT this am. Pt is Mod I for supine<>sit EOB. Pt is Min A for grooming activities and Max A for doffing/donning socks. Pt has already had a bath today. Continue OT POC     Time Calculation:   Time Calculation- OT     Row Name 08/11/21 1346             Time Calculation- OT    OT Start Time  1015  -BB      OT Stop Time  1055  -BB      OT Time Calculation (min)  40 min  -BB      Total Timed Code Minutes- OT  40 minute(s)  -BB      OT Received On  08/11/21  -BB         Timed Charges    35752 - OT Self Care/Mgmt Minutes  40  -BB         Total Minutes    Timed Charges Total Minutes  40  -BB       Total Minutes  40  -BB        User Key  (r) = Recorded By, (t) = Taken By, (c) = Cosigned By    Initials Name Provider Type    BB Hazel Molina COTA/L Occupational Therapy Assistant        Therapy  Charges for Today     Code Description Service Date Service Provider Modifiers Qty    60095227732 HC OT SELF CARE/MGMT/TRAIN EA 15 MIN 8/10/2021 Hazel Molina COTA/L GO 6    18670426581 HC OT SELF CARE/MGMT/TRAIN EA 15 MIN 8/10/2021 Hazel Molina COTA/L GO 2    40920800235 HC OT SELF CARE/MGMT/TRAIN EA 15 MIN 8/11/2021 Hazel Molina COTA/L GO 3               TAHIR Ragland/DAMIÁN  8/11/2021

## 2021-08-11 NOTE — CONSULTS
Adult Nutrition  Assessment    Patient Name:  Hitesh Rodarte  YOB: 1970  MRN: [id number]  Admit Date:  8/8/2021    Assessment Date:  8/11/2021    Comments:  RD has not been able to speak to pt today d/t being busy w/other disciplines each attempted visit. Review of intake records indicate pt eating % at meals. Weight appears stable. Will cont to monitor.    Reason for Assessment     Row Name 08/11/21 1348          Reason for Assessment    Reason For Assessment  follow-up protocol         Nutrition/Diet History     Row Name 08/11/21 1348          Nutrition/Diet History    Typical Food/Fluid Intake  Pt has been busy w/other disciplines each time RD tried to visit today.           Labs/Tests/Procedures/Meds     Row Name 08/11/21 1349          Labs/Procedures/Meds    Lab Results Reviewed  reviewed        Medications    Pertinent Medications Reviewed  reviewed             Nutrition Prescription Ordered     Row Name 08/11/21 1349          Nutrition Prescription PO    Current PO Diet  Regular     Fluid Consistency  Thin     Common Modifiers  Cardiac         Evaluation of Received Nutrient/Fluid Intake     Row Name 08/11/21 1349          PO Evaluation    Number of Meals  3     % PO Intake                 Electronically signed by:  Ann Slaughter RD  08/11/21 13:50 CDT

## 2021-08-11 NOTE — PLAN OF CARE
Goal Outcome Evaluation:           Progress: improving  Outcome Summary: vss, denies pain, mg replaced, [facility] monitor

## 2021-08-11 NOTE — THERAPY TREATMENT NOTE
Acute Care - Physical Therapy Treatment Note  [facility]     Patient Name: Hitesh Rodarte  : 1970  MRN: [id number]  Today's Date: 2021      Visit Dx:     ICD-10-CM ICD-9-CM   1. Chest pain, unspecified type  R07.9 786.50   2. Closed displaced fracture of greater tuberosity of right humerus, initial encounter  S42.251A 812.03   3. Hypokalemia  E87.6 276.8   4. Hypertensive urgency  I16.0 401.9   5. Impaired mobility and ADLs  Z74.09 V49.89    Z78.9    6. Impaired functional mobility, balance, gait, and endurance  Z74.09 V49.89     Patient Active Problem List   Diagnosis   • Chest pain     Past Medical History:   Diagnosis Date   • Alcohol abuse    • Hypertension      History reviewed. No pertinent surgical history.     PT Assessment (last 12 hours)      PT Evaluation and Treatment     Row Name 21 1339          Physical Therapy Time and Intention    Subjective Information  no complaints  -RW     Document Type  therapy note (daily note)  -RW     Mode of Treatment  physical therapy  -RW     Patient Effort  good  -     Row Name 21 1339          General Information    Patient Profile Reviewed  yes  -RW     Existing Precautions/Restrictions  non-weight bearing NWB RUE; RUE in sling  -RW     Equipment Issued to Patient  -- pt not wanting to wear sling as his iv is in arm bend  -     Row Name 21 1339          Cognition    Affect/Mental Status (Cognitive)  WFL  -     Orientation Status (Cognition)  oriented x 4  -     Row Name 21 1339          Pain Scale: Numbers Pre/Post-Treatment    Pretreatment Pain Rating  2/10  -RW     Posttreatment Pain Rating  2/10  -RW     Pre/Posttreatment Pain Comment  sore lots of places  -     Row Name 21 1339          Mobility    Extremity Weight-bearing Status  right upper extremity  -RW     Right Upper Extremity (Weight-bearing Status)  non weight-bearing (NWB)  -     Row Name 21 1339          Bed Mobility    Bed Mobility   supine-sit;sit-supine  -RW     Supine-Sit [place] (Bed Mobility)  standby assist  -RW     Sit-Supine [place] (Bed Mobility)  --  -RW     Assistive Device (Bed Mobility)  bed rails;head of bed elevated  -     Row Name 08/11/21 1339          Transfers    Transfers  sit-stand transfer;stand-sit transfer;toilet transfer  -RW     Sit-Stand [place] (Transfers)  contact guard  -RW     Stand-Sit [place] (Transfers)  contact guard  -RW     [place] Level (Toilet Transfer)  contact guard  -RW     Row Name 08/11/21 1339          Toilet Transfer    Type (Toilet Transfer)  sit-stand;stand-sit  -     Row Name 08/11/21 1339          Gait/Stairs (Locomotion)    Gait/Stairs Locomotion  gait/ambulation independence;gait/ambulation assistive device;distance ambulated;gait pattern;gait deviations;maintains weight-bearing status;stairs negotiation  -RW     [place] Level (Gait)  contact guard  -RW     Assistive Device (Gait)  -- no ad  -RW     Distance in Feet (Gait)  102  -RW     Pattern (Gait)  step-through  -RW     Deviations/Abnormal Patterns (Gait)  gait speed decreased pt guarded and SOA w/ gait. pt c/o pain around trunk.   -     Row Name 08/11/21 1339          Safety Issues, Functional Mobility    Impairments Affecting Function (Mobility)  balance;endurance/activity tolerance;pain;strength  -     Row Name 08/11/21 1339          Vital Signs    Pre Systolic BP Rehab  121  -RW     Pre Treatment Diastolic BP  81  -RW     Pretreatment Heart Rate (beats/min)  77  -RW     Posttreatment Heart Rate (beats/min)  77  -RW     Pre SpO2 (%)  98  -RW     O2 Delivery Pre Treatment  room air  -     Row Name 08/11/21 1339          Bed Mobility Goal 1 (PT)    Activity/Assistive Device (Bed Mobility Goal 1, PT)  sit to supine;supine to sit  -RW     [place] Level/Cues Needed (Bed Mobility Goal 1, PT)  independent  -RW     Time Frame (Bed Mobility Goal 1, PT)  3 days  -RW     Progress/Outcomes (Bed Mobility  Goal 1, PT)  goal not met  -RW     Row Name 08/11/21 1339          Transfer Goal 1 (PT)    Activity/Assistive Device (Transfer Goal 1, PT)  sit-to-stand/stand-to-sit;bed-to-chair/chair-to-bed  -RW     [place] Level/Cues Needed (Transfer Goal 1, PT)  modified independence  -RW     Time Frame (Transfer Goal 1, PT)  4 days  -RW     Progress/Outcome (Transfer Goal 1, PT)  goal not met  -RW     Row Name 08/11/21 1339          Gait Training Goal 1 (PT)    Activity/Assistive Device (Gait Training Goal 1, PT)  gait (walking locomotion);assistive device use;decrease fall risk;increase endurance/gait distance  -RW     [place] Level (Gait Training Goal 1, PT)  modified independence  -RW     Distance (Gait Training Goal 1, PT)  50ft or more  -RW     Time Frame (Gait Training Goal 1, PT)  by discharge  -RW     Progress/Outcome (Gait Training Goal 1, PT)  goal partially met  -RW     Row Name 08/11/21 1339          Positioning and Restraints    Pre-Treatment Position  in bed  -RW     Post Treatment Position  chair  -RW     In Chair  encouraged to call for assist;call light within reach  -RW       User Key  (r) = Recorded By, (t) = Taken By, (c) = Cosigned By    Initials Name Provider Type    Francisco Javier Zamudio, PTA Physical Therapy Assistant        Physical Therapy Education                 Title: PT OT SLP Therapies (In Progress)     Topic: Physical Therapy (In Progress)     Point: Mobility training (In Progress)     Learning Progress Summary           Patient Acceptance, E, NR by PIEDAD at 8/10/2021 1324    Acceptance, E, VU by RONNY at 8/9/2021 1437    Comment: Role of PT, POC, use of gait belt                   Point: Home exercise program (Not Started)     Learner Progress:  Not documented in this visit.          Point: Body mechanics (Not Started)     Learner Progress:  Not documented in this visit.          Point: Precautions (Done)     Learning Progress Summary           Patient Acceptance, E, VU by ORNNY at 8/9/2021 1434     Comment: Role of PT, POC, use of gait belt                               User Key     Initials Effective Dates Name Provider Type Discipline     06/16/21 -  Vinny Workman PTA Physical Therapy Assistant PT    KW 06/16/21 -  Mana Steve PT Physical Therapist PT              PT Recommendation and Plan     Plan of Care Reviewed With: patient  Progress: improving  Outcome Summary: pt is somewhat frustrated at his situation. case mgmt is trying to find a dc destination. sba for bed mob and sits eob with ind. stands with cga and gt no ad with cga and some cruising x 102' back in room in chair.  Outcome Measures     Row Name 08/10/21 0956             How much help from another person do you currently need...    Turning from your back to your side while in flat bed without using bedrails?  3  -PIEDAD      Moving from lying on back to sitting on the side of a flat bed without bedrails?  3  -PIEDAD      Moving to and from a bed to a chair (including a wheelchair)?  3  -PIEDAD      Standing up from a chair using your arms (e.g., wheelchair, bedside chair)?  3  -PIEDAD      Climbing 3-5 steps with a railing?  3  -PIEDAD      To walk in hospital room?  3  -PIEDAD      AM-PAC 6 Clicks Score (PT)  18  -         Functional Assessment    Outcome Measure Options  AM-PAC 6 Clicks Basic Mobility (PT)  -        User Key  (r) = Recorded By, (t) = Taken By, (c) = Cosigned By    Initials Name Provider Type     Vinny Workman PTA Physical Therapy Assistant           Time Calculation:   PT Charges     Row Name 08/11/21 1505             Time Calculation    Start Time  1339  -RW      Stop Time  1409  -RW      Time Calculation (min)  30 min  -RW         Time Calculation- PT    Total Timed Code Minutes- PT  30 minute(s)  -RW         Timed Charges    22208 - Gait Training Minutes   10  -RW      48212 - PT Therapeutic Activity Minutes  20  -RW         Total Minutes    Timed Charges Total Minutes  30  -RW       Total Minutes  30  -RW        User Key   (r) = Recorded By, (t) = Taken By, (c) = Cosigned By    Initials Name Provider Type     Francisco Javier Contreras PTA Physical Therapy Assistant        Therapy Charges for Today     Code Description Service Date Service Provider Modifiers Qty    15284521818 HC GAIT TRAINING EA 15 MIN 8/11/2021 Francisco Javier Contreras PTA GP 1    85335229043 HC PT THERAPEUTIC ACT EA 15 MIN 8/11/2021 Francisco Javier Contreras PTA GP 1          PT G-Codes  Outcome Measure Options: AM-PAC 6 Clicks Basic Mobility (PT)  AM-PAC 6 Clicks Score (PT): 18  AM-PAC 6 Clicks Score (OT): 14    Francisco Javier Contreras PTA  8/11/2021

## 2021-08-11 NOTE — PROGRESS NOTES
[facility] Medicine Services  INPATIENT PROGRESS NOTE    Length of Stay: 0  Date of Admission: 8/8/2021  Primary Care Physician: Provider, No Known    Subjective   Chief Complaint: admitted with etoh intoxication and chest pain and elevated bp and concerned about his life and being homeless and needing rehab and needing help     Bp is controlled  He does not need acute alcohol detox at this time.  He is not intoxicated.  He has not been agitated.  He sustained a fall on Friday and fractured his wrist which is in a sling and can see ortho outpatient for evaluation.  Was at  [facility] rehab home and was not getting any help to better his situation .  He wants to go to the [facility] alcohol rehab ctr in [place].  He is not psychotic and not suicidal and he has no emotional issues, that are at large at present that are a threat.  He is being transferred there for acute alcohol abuse and wants help and wishes to go voluntarily to this facility to get help and start living his life.  I spoke to his sister from [facility] and spoke to her about the same and she wants him to get the help he needs as well.     She is very supportive of him   We are controlling his blood pressure.   HPI:    As stated above.   He has been abusing etoh all of his life   Family wants him to get help and get rehab.   Get to a point of a real life and existence  He has elevated bp   Cough   Fatigue   Weakness   Lack of place to live.     Review of Systems   Constitutional: Positive for activity change.   HENT: Positive for congestion.    Eyes: Negative.    Respiratory: Positive for cough.    Cardiovascular: Positive for palpitations.   Gastrointestinal: Negative.    Endocrine: Negative.    Genitourinary: Negative.    Musculoskeletal: Positive for arthralgias.   Skin: Negative.    Allergic/Immunologic: Negative.    Neurological: Negative.    Hematological: Negative.    Psychiatric/Behavioral:  Negative.       All pertinent negatives and positives are as above. All other systems have been reviewed and are negative unless otherwise stated.     Objective    Temp:  [96.9 °F (36.1 °C)-98.7 °F (37.1 °C)] 98.2 °F (36.8 °C)  Heart Rate:  [63-82] 63  Resp:  [18] 18  BP: (122-178)/() 159/99    Physical Exam  Vitals and nursing note reviewed.   Constitutional:       General: He is in acute distress.   HENT:      Head: Normocephalic and atraumatic.      Nose: Nose normal.   Eyes:      Conjunctiva/sclera: Conjunctivae normal.   Cardiovascular:      Pulses: Normal pulses.   Abdominal:      General: Abdomen is flat. Bowel sounds are normal.   Musculoskeletal:         General: Normal range of motion.      Cervical back: Normal range of motion.   Neurological:      General: No focal deficit present.   Psychiatric:         Mood and Affect: Mood normal.             Results Review:  I have reviewed the labs, radiology results, and diagnostic studies.    Laboratory Data:   Results from last 7 days   Lab Units 08/11/21  0534 08/09/21  0547 08/08/21  1713 08/08/21  1320 08/08/21  1320   SODIUM mmol/L 133* 131*  --   --  133*   POTASSIUM mmol/L 3.9 3.7 2.8*   < > 2.7*   CHLORIDE mmol/L 99 95*  --   --  90*   CO2 mmol/L 26.0 27.0  --   --  28.0   BUN mg/dL 5* 5*  --   --  7   CREATININE mg/dL 0.56* 0.56*  --   --  0.58*   GLUCOSE mg/dL 91 111*  --   --  129*   CALCIUM mg/dL 9.2 9.6  --   --  10.3   BILIRUBIN mg/dL 0.9 1.4*  --   --  2.0*   ALK PHOS U/L 97 103  --   --  130*   ALT (SGPT) U/L 77* 101*  --   --  148*   AST (SGOT) U/L 91* 142*  --   --  247*   ANION GAP mmol/L 8.0 9.0  --   --  15.0    < > = values in this interval not displayed.     Estimated Creatinine Clearance: 183.5 mL/min (A) (by C-G formula based on SCr of 0.56 mg/dL (L)).  Results from last 7 days   Lab Units 08/11/21  0534 08/09/21  0547 08/08/21  1456   MAGNESIUM mg/dL 1.2* 1.2* 1.0*         Results from last 7 days   Lab Units 08/11/21  0534  08/09/21  0547 08/08/21  1320 08/05/21  1324   WBC 10*3/mm3 6.77 6.23 8.16 12.17*   HEMOGLOBIN g/dL 14.4 14.0 15.5 17.0   HEMATOCRIT % 42.4 40.1 44.3 47.6   PLATELETS 10*3/mm3 140 98* 93* 76*     Results from last 7 days   Lab Units 08/08/21  1320   INR  0.94       Culture Data:   No results found for: BLOODCX  No results found for: URINECX  No results found for: RESPCX  No results found for: WOUNDCX  No results found for: STOOLCX  No components found for: BODYFLD    Radiology Data:   Imaging Results (Last 24 Hours)     ** No results found for the last 24 hours. **          I have reviewed the patient's current medications.     Assessment/Plan     Active Hospital Problems    Diagnosis    • Chest pain        Plan:  Patient admitted due etoh intoxication and is currently homeless and wants to go to rehab and his family wants him to go to rehab.     He is in agreement with transfer to rehab.     He is dehydrated and repleted volume and he feels betterl     And agitated   And anxious   And has been given meds to help him     Elevated bp treated     'has been using etoh most of his life.       The patient was evaluated during the global COVID-19 pandemic, and the diagnosis was suspected/considered upon their initial presentation.  Evaluation, treatment, and testing were consistent with current guidelines for patients who present with complaints or symptoms that may be related to COVID-19.    I confirmed that the patient's Advance Care Plan is present, code status is documented, or surrogate decision maker is listed in the patient's medical record.       Discharge Planning: I expect patient to be discharged to 48 hours     Isabel Kerr MD

## 2021-08-11 NOTE — PLAN OF CARE
Goal Outcome Evaluation:           Progress: improving  Outcome Summary: VSS; pain being controlled; resting inbetween care; will continue to monitor

## 2021-08-11 NOTE — PLAN OF CARE
Problem: Adult Inpatient Plan of Care  Goal: Plan of Care Review  Flowsheets  Taken 8/11/2021 1343  Progress: improving  Plan of Care Reviewed With: patient  Taken 8/11/2021 1015  Progress: improving  Plan of Care Reviewed With: patient  Outcome Summary: Pt agrees to OT this am. Pt is Mod I for supine<>sit EOB. Pt is Min A for grooming activities and Max A for doffing/donning socks. Pt has already had a bath today. Continue OT POC   Goal Outcome Evaluation:  Plan of Care Reviewed With: patient        Progress: improving  Outcome Summary: Pt educated on home safety/fall prevention. Pt CGA for bed<>toilet t/f. Continue OT POC

## 2021-08-11 NOTE — PLAN OF CARE
Goal Outcome Evaluation:  Plan of Care Reviewed With: patient        Progress: improving  Outcome Summary: pt is somewhat frustrated at his situation. case mgmt is trying to find a dc destination. sba for bed mob and sits eob with ind. stands with cga and gt no ad with cga and some cruising x 102' back in room in chair.

## 2021-08-12 ENCOUNTER — READMISSION MANAGEMENT (OUTPATIENT)
Dept: CALL CENTER | Facility: HOSPITAL | Age: 51
End: 2021-08-12

## 2021-08-12 ENCOUNTER — HOSPITAL ENCOUNTER (EMERGENCY)
Facility: HOSPITAL | Age: 51
Discharge: HOME | End: 2021-08-12
Attending: EMERGENCY MEDICINE | Admitting: EMERGENCY MEDICINE

## 2021-08-12 VITALS
SYSTOLIC BLOOD PRESSURE: 143 MMHG | BODY MASS INDEX: 24.71 KG/M2 | TEMPERATURE: 97.5 F | DIASTOLIC BLOOD PRESSURE: 97 MMHG | HEIGHT: 71 IN | OXYGEN SATURATION: 98 % | HEART RATE: 71 BPM | RESPIRATION RATE: 18 BRPM | WEIGHT: 176.5 LBS

## 2021-08-12 VITALS
HEART RATE: 101 BPM | WEIGHT: 175.8 LBS | DIASTOLIC BLOOD PRESSURE: 87 MMHG | TEMPERATURE: 98.7 F | HEIGHT: 71 IN | OXYGEN SATURATION: 98 % | BODY MASS INDEX: 24.61 KG/M2 | RESPIRATION RATE: 18 BRPM | SYSTOLIC BLOOD PRESSURE: 120 MMHG

## 2021-08-12 DIAGNOSIS — F41.9 ANXIETY: Primary | ICD-10-CM

## 2021-08-12 DIAGNOSIS — F10.20 ALCOHOLISM (HCC): ICD-10-CM

## 2021-08-12 LAB
ALBUMIN SERPL-MCNC: 3.4 G/DL (ref 3.5–5.2)
ALBUMIN SERPL-MCNC: 3.8 G/DL (ref 3.5–5.2)
ALBUMIN/GLOB SERPL: 1.2 G/DL
ALBUMIN/GLOB SERPL: 1.3 G/DL
ALP SERPL-CCNC: 103 U/L (ref 39–117)
ALP SERPL-CCNC: 93 U/L (ref 39–117)
ALT SERPL W P-5'-P-CCNC: 74 U/L (ref 1–41)
ALT SERPL W P-5'-P-CCNC: 91 U/L (ref 1–41)
ANION GAP SERPL CALCULATED.3IONS-SCNC: 11 MMOL/L (ref 5–15)
ANION GAP SERPL CALCULATED.3IONS-SCNC: 7 MMOL/L (ref 5–15)
AST SERPL-CCNC: 107 U/L (ref 1–40)
AST SERPL-CCNC: 79 U/L (ref 1–40)
BASOPHILS # BLD AUTO: 0.08 10*3/MM3 (ref 0–0.2)
BASOPHILS NFR BLD AUTO: 0.8 % (ref 0–1.5)
BILIRUB SERPL-MCNC: 0.6 MG/DL (ref 0–1.2)
BILIRUB SERPL-MCNC: 0.7 MG/DL (ref 0–1.2)
BUN SERPL-MCNC: 6 MG/DL (ref 6–20)
BUN SERPL-MCNC: 7 MG/DL (ref 6–20)
BUN/CREAT SERPL: 10 (ref 7–25)
BUN/CREAT SERPL: 11.8 (ref 7–25)
CALCIUM SPEC-SCNC: 9.7 MG/DL (ref 8.6–10.5)
CALCIUM SPEC-SCNC: 9.8 MG/DL (ref 8.6–10.5)
CHLORIDE SERPL-SCNC: 100 MMOL/L (ref 98–107)
CHLORIDE SERPL-SCNC: 98 MMOL/L (ref 98–107)
CO2 SERPL-SCNC: 23 MMOL/L (ref 22–29)
CO2 SERPL-SCNC: 24 MMOL/L (ref 22–29)
CREAT SERPL-MCNC: 0.51 MG/DL (ref 0.76–1.27)
CREAT SERPL-MCNC: 0.7 MG/DL (ref 0.76–1.27)
DEPRECATED RDW RBC AUTO: 55.5 FL (ref 37–54)
DEPRECATED RDW RBC AUTO: 57.5 FL (ref 37–54)
EOSINOPHIL # BLD AUTO: 0.18 10*3/MM3 (ref 0–0.4)
EOSINOPHIL NFR BLD AUTO: 1.8 % (ref 0.3–6.2)
ERYTHROCYTE [DISTWIDTH] IN BLOOD BY AUTOMATED COUNT: 15.5 % (ref 12.3–15.4)
ERYTHROCYTE [DISTWIDTH] IN BLOOD BY AUTOMATED COUNT: 15.6 % (ref 12.3–15.4)
GFR SERPL CREATININE-BSD FRML MDRD: 120 ML/MIN/1.73
GFR SERPL CREATININE-BSD FRML MDRD: >150 ML/MIN/1.73
GLOBULIN UR ELPH-MCNC: 2.8 GM/DL
GLOBULIN UR ELPH-MCNC: 3 GM/DL
GLUCOSE SERPL-MCNC: 100 MG/DL (ref 65–99)
GLUCOSE SERPL-MCNC: 83 MG/DL (ref 65–99)
HCT VFR BLD AUTO: 41.2 % (ref 37.5–51)
HCT VFR BLD AUTO: 43.6 % (ref 37.5–51)
HGB BLD-MCNC: 14.9 G/DL (ref 13–17.7)
HGB BLD-MCNC: 14.9 G/DL (ref 13–17.7)
HOLD SPECIMEN: NORMAL
IMM GRANULOCYTES # BLD AUTO: 0.06 10*3/MM3 (ref 0–0.05)
IMM GRANULOCYTES NFR BLD AUTO: 0.6 % (ref 0–0.5)
LYMPHOCYTES # BLD AUTO: 2.87 10*3/MM3 (ref 0.7–3.1)
LYMPHOCYTES NFR BLD AUTO: 29.1 % (ref 19.6–45.3)
MAGNESIUM SERPL-MCNC: 1.8 MG/DL (ref 1.6–2.6)
MCH RBC QN AUTO: 34.3 PG (ref 26.6–33)
MCH RBC QN AUTO: 36 PG (ref 26.6–33)
MCHC RBC AUTO-ENTMCNC: 34.2 G/DL (ref 31.5–35.7)
MCHC RBC AUTO-ENTMCNC: 36.2 G/DL (ref 31.5–35.7)
MCV RBC AUTO: 100.2 FL (ref 79–97)
MCV RBC AUTO: 99.5 FL (ref 79–97)
MONOCYTES # BLD AUTO: 1.41 10*3/MM3 (ref 0.1–0.9)
MONOCYTES NFR BLD AUTO: 14.3 % (ref 5–12)
NEUTROPHILS NFR BLD AUTO: 5.26 10*3/MM3 (ref 1.7–7)
NEUTROPHILS NFR BLD AUTO: 53.4 % (ref 42.7–76)
NRBC BLD AUTO-RTO: 0 /100 WBC (ref 0–0.2)
PLATELET # BLD AUTO: 155 10*3/MM3 (ref 140–450)
PLATELET # BLD AUTO: 225 10*3/MM3 (ref 140–450)
PMV BLD AUTO: 9.2 FL (ref 6–12)
PMV BLD AUTO: 9.3 FL (ref 6–12)
POTASSIUM SERPL-SCNC: 3.3 MMOL/L (ref 3.5–5.2)
POTASSIUM SERPL-SCNC: 3.7 MMOL/L (ref 3.5–5.2)
PROT SERPL-MCNC: 6.2 G/DL (ref 6–8.5)
PROT SERPL-MCNC: 6.8 G/DL (ref 6–8.5)
QT INTERVAL: 400 MS
QTC INTERVAL: 464 MS
RBC # BLD AUTO: 4.14 10*6/MM3 (ref 4.14–5.8)
RBC # BLD AUTO: 4.35 10*6/MM3 (ref 4.14–5.8)
SODIUM SERPL-SCNC: 131 MMOL/L (ref 136–145)
SODIUM SERPL-SCNC: 132 MMOL/L (ref 136–145)
WBC # BLD AUTO: 6.61 10*3/MM3 (ref 3.4–10.8)
WBC # BLD AUTO: 9.86 10*3/MM3 (ref 3.4–10.8)

## 2021-08-12 PROCEDURE — 99283 EMERGENCY DEPT VISIT LOW MDM: CPT

## 2021-08-12 PROCEDURE — 80053 COMPREHEN METABOLIC PANEL: CPT | Performed by: INTERNAL MEDICINE

## 2021-08-12 PROCEDURE — 97535 SELF CARE MNGMENT TRAINING: CPT

## 2021-08-12 PROCEDURE — 25010000003 MAGNESIUM SULFATE 4 GM/100ML SOLUTION: Performed by: INTERNAL MEDICINE

## 2021-08-12 PROCEDURE — 80053 COMPREHEN METABOLIC PANEL: CPT | Performed by: EMERGENCY MEDICINE

## 2021-08-12 PROCEDURE — G0378 HOSPITAL OBSERVATION PER HR: HCPCS

## 2021-08-12 PROCEDURE — 85027 COMPLETE CBC AUTOMATED: CPT | Performed by: INTERNAL MEDICINE

## 2021-08-12 PROCEDURE — 83735 ASSAY OF MAGNESIUM: CPT | Performed by: INTERNAL MEDICINE

## 2021-08-12 PROCEDURE — 97116 GAIT TRAINING THERAPY: CPT

## 2021-08-12 PROCEDURE — 97110 THERAPEUTIC EXERCISES: CPT

## 2021-08-12 PROCEDURE — 97530 THERAPEUTIC ACTIVITIES: CPT

## 2021-08-12 PROCEDURE — 85025 COMPLETE CBC W/AUTO DIFF WBC: CPT | Performed by: EMERGENCY MEDICINE

## 2021-08-12 RX ORDER — POTASSIUM CHLORIDE 750 MG/1
20 CAPSULE, EXTENDED RELEASE ORAL DAILY
Qty: 20 CAPSULE | Refills: 0 | Status: SHIPPED | OUTPATIENT
Start: 2021-08-12 | End: 2021-08-22

## 2021-08-12 RX ORDER — METOPROLOL TARTRATE 75 MG/1
100 TABLET, FILM COATED ORAL 2 TIMES DAILY
Qty: 60 TABLET | Refills: 10 | Status: SHIPPED | OUTPATIENT
Start: 2021-08-12 | End: 2021-09-11

## 2021-08-12 RX ADMIN — MAGNESIUM SULFATE 4 G: 4 INJECTION INTRAVENOUS at 09:42

## 2021-08-12 RX ADMIN — METOPROLOL TARTRATE 75 MG: 50 TABLET, FILM COATED ORAL at 08:17

## 2021-08-12 RX ADMIN — THERA TABS 1 TABLET: TAB at 08:17

## 2021-08-12 RX ADMIN — POTASSIUM CHLORIDE 40 MEQ: 750 CAPSULE, EXTENDED RELEASE ORAL at 08:17

## 2021-08-12 RX ADMIN — FOLIC ACID 1 MG: 1 TABLET ORAL at 08:17

## 2021-08-12 RX ADMIN — SODIUM CHLORIDE, PRESERVATIVE FREE 10 ML: 5 INJECTION INTRAVENOUS at 08:17

## 2021-08-12 RX ADMIN — THIAMINE HCL TAB 100 MG 100 MG: 100 TAB at 08:17

## 2021-08-12 NOTE — THERAPY TREATMENT NOTE
Patient Name: Hitesh Rodarte  : 1970    MRN: [id number]                              Today's Date: 2021       Admit Date: 2021    Visit Dx:     ICD-10-CM ICD-9-CM   1. Chest pain, unspecified type  R07.9 786.50   2. Closed displaced fracture of greater tuberosity of right humerus, initial encounter  S42.251A 812.03   3. Hypokalemia  E87.6 276.8   4. Hypertensive urgency  I16.0 401.9   5. Impaired mobility and ADLs  Z74.09 V49.89    Z78.9    6. Impaired functional mobility, balance, gait, and endurance  Z74.09 V49.89     Patient Active Problem List   Diagnosis   • Chest pain     Past Medical History:   Diagnosis Date   • Alcohol abuse    • Hypertension      History reviewed. No pertinent surgical history.  General Information     Row Name 21 1050          OT Time and Intention    Document Type  therapy note (daily note)  -BB     Mode of Treatment  individual therapy;occupational therapy  -BB     Row Name 21 1050          General Information    Patient Profile Reviewed  yes  -BB     Existing Precautions/Restrictions  non-weight bearing NWB RUE; RUE in sling  -BB     Row Name 21 1050          Cognition    Orientation Status (Cognition)  oriented x 4  -BB     Row Name 21 1050          Safety Issues, Functional Mobility    Impairments Affecting Function (Mobility)  balance;endurance/activity tolerance;pain;strength  -BB       User Key  (r) = Recorded By, (t) = Taken By, (c) = Cosigned By    Initials Name Provider Type    BB Hazel Molina COTA/L Occupational Therapy Assistant          Mobility/ADL's     Row Name 21 1050          Bed Mobility    Bed Mobility  supine-sit;sit-supine  -BB     Supine-Sit [place] (Bed Mobility)  standby assist  -BB     Sit-Supine [place] (Bed Mobility)  modified independence  -BB     Bed Mobility, Safety Issues  decreased use of arms for pushing/pulling  -BB     Assistive Device (Bed Mobility)  bed rails;head of bed elevated   -[facility] Name 08/12/21 1050          Transfers    Sit-Stand [place] (Transfers)  supervision  -     [place] Level (Toilet Transfer)  contact guard  -[facility] Name 08/12/21 1050          Toilet Transfer    Type (Toilet Transfer)  sit-stand;stand-sit  -BB     Row Name 08/12/21 1050          Functional Mobility    Functional Mobility- Ind. Level  contact guard assist  -BB     Row Name 08/12/21 1050          Activities of Daily Living    BADL Assessment/Intervention  bathing;upper body dressing;lower body dressing;grooming;toileting  -BB     Row Name 08/12/21 1050          Mobility    Extremity Weight-bearing Status  right upper extremity  -     Right Upper Extremity (Weight-bearing Status)  non weight-bearing (NWB)  -BB     Row Name 08/12/21 1050          Bathing Assessment/Intervention    [place] Level (Bathing)  upper body;minimum assist (75% patient effort);verbal cues;lower body;maximum assist (25% patient effort)  -     Position (Bathing)  edge of bed sitting  -BB     Row Name 08/12/21 1050          Upper Body Dressing Assessment/Training    [place] Level (Upper Body Dressing)  doff;don;minimum assist (75% patient effort) HG  -     Position (Upper Body Dressing)  edge of bed sitting  -[facility] Name 08/12/21 1050          Lower Body Dressing Assessment/Training    [place] Level (Lower Body Dressing)  doff;don;socks;maximum assist (25% patient effort)  -     Position (Lower Body Dressing)  edge of bed sitting  -[facility] Name 08/12/21 1050          Grooming Assessment/Training    [place] Level (Grooming)  wash face, hands;hair care, combing/brushing;oral care regimen;minimum assist (75% patient effort)  -     Assistive Devices (Grooming)  electric razor  -     Position (Grooming)  edge of bed sitting  -[facility] Name 08/12/21 1050          Toileting Assessment/Training    [place] Level (Toileting)  adjust/manage clothing;perform perineal hygiene;minimum  assist (75% patient effort)  -BB     Assistive Devices (Toileting)  commode  -BB     Position (Toileting)  supported standing  -BB       User Key  (r) = Recorded By, (t) = Taken By, (c) = Cosigned By    Initials Name Provider Type    Hazel Wise COTA/L Occupational Therapy Assistant        Obj/Interventions    No documentation.       Goals/Plan     Row Name 08/12/21 1050          Transfer Goal 1 (OT)    Activity/Assistive Device (Transfer Goal 1, OT)  sit-to-stand/stand-to-sit;bed-to-chair/chair-to-bed;toilet  -BB     [place] Level/Cues Needed (Transfer Goal 1, OT)  modified independence  -BB     Time Frame (Transfer Goal 1, OT)  by discharge;long term goal (LTG)  -BB     Progress/Outcome (Transfer Goal 1, OT)  goal not met  -BB     Row Name 08/12/21 1050          Dressing Goal 1 (OT)    Activity/Device (Dressing Goal 1, OT)  dressing skills, all  -BB     [place]/Cues Needed (Dressing Goal 1, OT)  set-up required;supervision required  -BB     Time Frame (Dressing Goal 1, OT)  long term goal (LTG);by discharge  -BB     Progress/Outcome (Dressing Goal 1, OT)  goal not met  -BB     Row Name 08/12/21 1050          Toileting Goal 1 (OT)    Activity/Device (Toileting Goal 1, OT)  toileting skills, all  -BB     [place] Level/Cues Needed (Toileting Goal 1, OT)  modified independence  -BB     Time Frame (Toileting Goal 1, OT)  long term goal (LTG);by discharge  -BB     Progress/Outcome (Toileting Goal 1, OT)  goal not met  -BB       User Key  (r) = Recorded By, (t) = Taken By, (c) = Cosigned By    Initials Name Provider Type    Hazel Wise COTA/L Occupational Therapy Assistant        Clinical Impression     Row Name 08/12/21 1050          Pain Scale: Numbers Pre/Post-Treatment    Pretreatment Pain Rating  2/10  -BB     Posttreatment Pain Rating  2/10  -BB     Pain Location - Side  Right  -BB     Pain Location  shoulder  -BB     Pain Intervention(s)  Repositioned  -BB     Row Name  08/12/21 1050          Plan of Care Review    Plan of Care Reviewed With  patient  -BB     Progress  improving  -BB     Row Name 08/12/21 1050          Therapy Assessment/Plan (OT)    Rehab Potential (OT)  good, to achieve stated therapy goals  -BB     Criteria for Skilled Therapeutic Interventions Met (OT)  yes;meets criteria;skilled treatment is necessary  -BB     Therapy Frequency (OT)  other (see comments) 5-7days/wk  -BB     Row Name 08/12/21 1050          Therapy Plan Review/Discharge Plan (OT)    Anticipated Discharge Disposition (OT)  home with assist  -BB       User Key  (r) = Recorded By, (t) = Taken By, (c) = Cosigned By    Initials Name Provider Type    Hazel Wise COTA/L Occupational Therapy Assistant        Outcome Measures     Row Name 08/12/21 1050          How much help from another is currently needed...    Putting on and taking off regular lower body clothing?  2  -BB     Bathing (including washing, rinsing, and drying)  2  -BB     Toileting (which includes using toilet bed pan or urinal)  2  -BB     Putting on and taking off regular upper body clothing  2  -BB     Taking care of personal grooming (such as brushing teeth)  3  -BB     Eating meals  3  -BB     AM-PAC 6 Clicks Score (OT)  14  -BB       User Key  (r) = Recorded By, (t) = Taken By, (c) = Cosigned By    Initials Name Provider Type    Hazel Wise COTA/L Occupational Therapy Assistant          Occupational Therapy Education                 Title: PT OT SLP Therapies (In Progress)     Topic: Occupational Therapy (In Progress)     Point: ADL training (Done)     Description:   Instruct learner(s) on proper safety adaptation and remediation techniques during self care or transfers.   Instruct in proper use of assistive devices.              Learning Progress Summary           Patient Acceptance, E, VU by BB at 8/12/2021 1334    Acceptance, E, NR by BB at 8/11/2021 1343    Acceptance, E, NR by BB at 8/10/2021 115                    Point: Home exercise program (Not Started)     Description:   Instruct learner(s) on appropriate technique for monitoring, assisting and/or progressing therapeutic exercises/activities.              Learner Progress:  Not documented in this visit.          Point: Precautions (In Progress)     Description:   Instruct learner(s) on prescribed precautions during self-care and functional transfers.              Learning Progress Summary           Patient Acceptance, E, NR by BB at 8/10/2021 1155    Acceptance, E, VU by AS at 8/9/2021 1431    Comment: role of OT, OT POC, bed mobility, transfer training                   Point: Body mechanics (Done)     Description:   Instruct learner(s) on proper positioning and spine alignment during self-care, functional mobility activities and/or exercises.              Learning Progress Summary           Patient Acceptance, E, VU by BB at 8/12/2021 1334    Acceptance, E, NR by BB at 8/11/2021 1343    Acceptance, E, NR by BB at 8/10/2021 1155                               User Key     Initials Effective Dates Name Provider Type Discipline    BB 06/16/21 -  Hazel Molina COTA/L Occupational Therapy Assistant OT    AS 03/18/21 -  Jennifer Hwang, OT Occupational Therapist OT              OT Recommendation and Plan  Therapy Frequency (OT): other (see comments) (5-7days/wk)  Plan of Care Review  Plan of Care Reviewed With: patient  Progress: improving  Outcome Summary: Pt agrees to OT this am. Pt is Mod I for supine<>sit EOB. Pt is Min A for grooming activities and Max A for doffing/donning socks. Pt has already had a bath today. Continue OT POC     Time Calculation:   Time Calculation- OT     Row Name 08/12/21 1334 08/12/21 1241          Time Calculation- OT    OT Start Time  1050  -BB  --     OT Stop Time  1129  -BB  --     OT Time Calculation (min)  39 min  -BB  --     Total Timed Code Minutes- OT  39 minute(s)  -BB  --     OT Received On  08/12/21  -BB  --         Timed Charges    21789 - Gait Training Minutes   --  20  -RW     93642 - OT Self Care/Mgmt Minutes  39  -BB  --        Total Minutes    Timed Charges Total Minutes  39  -BB  20  -RW      Total Minutes  39  -BB  20  -RW       User Key  (r) = Recorded By, (t) = Taken By, (c) = Cosigned By    Initials Name Provider Type    RW Francisco Javier Contreras, PTA Physical Therapy Assistant    BB Hazel Molina, HARO/L Occupational Therapy Assistant        Therapy Charges for Today     Code Description Service Date Service Provider Modifiers Qty    57798458051 HC OT SELF CARE/MGMT/TRAIN EA 15 MIN 8/11/2021 Hazel Molina COTA/L GO 3    99070011943 HC OT SELF CARE/MGMT/TRAIN EA 15 MIN 8/12/2021 Hazel Molina COTA/L GO 3               TAHIR Ragland/DAMIÁN  8/12/2021

## 2021-08-12 NOTE — NURSING NOTE
[facility] called with update on [facility] consult. [facility] stated since [facility] does not actively have [facility] in [facility] system that they would not be able to admit him inpatient. However, they were going to put a consult in for outpatient therapy.

## 2021-08-12 NOTE — PLAN OF CARE
Goal Outcome Evaluation:  Plan of Care Reviewed With: patient        Progress: no change  Outcome Summary: vss, no c/o pain at this time, [facility], consult for outpt rehab to be done, resting between care, no acute changes

## 2021-08-12 NOTE — DISCHARGE SUMMARY
[facility] Medicine Services  DISCHARGE SUMMARY       Date of Admission: 8/8/2021  Date of Discharge:  8/12/2021  Primary Care Physician: Provider, No Known    Presenting Problem/History of Present Illness:  Hypokalemia [E87.6]  Hypertensive urgency [I16.0]  Closed displaced fracture of greater tuberosity of right humerus, initial encounter [S42.251A]  Chest pain, unspecified type [R07.9]   Alcohol liver disease  And alcohol dependence  And wants alcohol detoxification     Final Discharge Diagnoses:  Active Hospital Problems    Diagnosis    • Chest pain        Consults:   Consults     Date and Time Order Name Status Description    8/8/2021  2:48 PM Hospitalist (on-call MD unless specified) Completed           Procedures Performed:                 Pertinent Test Results:   Lab Results (most recent)     Procedure Component Value Units Date/Time    Magnesium [[id number]]  (Normal) Collected: 08/12/21 0622    Specimen: Blood Updated: 08/12/21 0719     Magnesium 1.8 mg/dL     Comprehensive Metabolic Panel [[id number]]  (Abnormal) Collected: 08/12/21 0622    Specimen: Blood Updated: 08/12/21 0703     Glucose 100 mg/dL      BUN 6 mg/dL      Creatinine 0.51 mg/dL      Sodium 131 mmol/L      Potassium 3.3 mmol/L      Chloride 100 mmol/L      CO2 24.0 mmol/L      Calcium 9.7 mg/dL      Total Protein 6.2 g/dL      Albumin 3.40 g/dL      ALT (SGPT) 74 U/L      AST (SGOT) 79 U/L      Alkaline Phosphatase 93 U/L      Total Bilirubin 0.7 mg/dL      eGFR Non African Amer >150 mL/min/1.73      Globulin 2.8 gm/dL      A/G Ratio 1.2 g/dL      BUN/Creatinine Ratio 11.8     Anion Gap 7.0 mmol/L     Narrative:      GFR Normal >60  Chronic Kidney Disease <60  Kidney Failure <15      CBC (No Diff) [[id number]]  (Abnormal) Collected: 08/12/21 0622    Specimen: Blood Updated: 08/12/21 0646     WBC 6.61 10*3/mm3      RBC 4.14 10*6/mm3      Hemoglobin 14.9 g/dL      Hematocrit 41.2 %      MCV 99.5 fL       MCH 36.0 pg      MCHC 36.2 g/dL      RDW 15.6 %      RDW-SD 55.5 fl      MPV 9.2 fL      Platelets 155 10*3/mm3     Urine Drug Screen - Urine, Clean Catch [[id number]]  (Normal) Collected: 08/11/21 1422    Specimen: Urine, Clean Catch Updated: 08/11/21 1717     THC, Screen, Urine Negative     Phencyclidine (PCP), Urine Negative     Cocaine Screen, Urine Negative     Methamphetamine, Ur Negative     Opiate Screen Negative     Amphetamine Screen, Urine Negative     Benzodiazepine Screen, Urine Negative     Tricyclic Antidepressants Screen Negative     Methadone Screen, Urine Negative     Barbiturates Screen, Urine Negative     Oxycodone Screen, Urine Negative     Propoxyphene Screen Negative     Buprenorphine, Screen, Urine Negative    Narrative:      Cutoff For Drugs Screened:    Amphetamines               500 ng/ml  Barbiturates               200 ng/ml  Benzodiazepines            150 ng/ml  Cocaine                    150 ng/ml  Methadone                  200 ng/ml  Opiates                    100 ng/ml  Phencyclidine               25 ng/ml  THC                            50 ng/ml  Methamphetamine            500 ng/ml  Tricyclic Antidepressants  300 ng/ml  Oxycodone                  100 ng/ml  Propoxyphene               300 ng/ml  Buprenorphine               10 ng/ml    The normal value for all drugs tested is negative. This report includes unconfirmed screening results, with the cutoff values listed, to be used for medical treatment purposes only.  Unconfirmed results must not be used for non-medical purposes such as employment or legal testing.  Clinical consideration should be applied to any drug of abuse test, particularly when unconfirmed results are used.      COVID PRE-OP / PRE-PROCEDURE SCREENING ORDER (NO ISOLATION) - Swab, Nasopharynx [[id number]]  (Normal) Collected: 08/11/21 1421    Specimen: Swab from Nasopharynx Updated: 08/11/21 1511    Narrative:      The following orders were created for panel order  COVID PRE-OP / PRE-PROCEDURE SCREENING ORDER (NO ISOLATION) - Swab, Nasopharynx.  Procedure                               Abnormality         Status                     ---------                               -----------         ------                     COVID-19, [facility] IN-...[[id number]]  Normal              Final result                 Please view results for these tests on the individual orders.    COVID-19, [facility] IN-HOUSE, NP SWAB IN TRANSPORT MEDIA 8-10 HR TAT - Swab, Nasopharynx [[id number]]  (Normal) Collected: 08/11/21 1421    Specimen: Swab from Nasopharynx Updated: 08/11/21 1511     COVID19 Not Detected    Narrative:      Testing performed by Real Time RT-PCR  This test has not been approved by the [facility] but is authorized under the Emergency Use Act (EUA)    Fact sheet for providers: https://www.fda.gov/media/747778/download    Fact sheet for patients: https://www.fda.gov/media/897647/download        Urinalysis With Microscopic If Indicated (No Culture) - Urine, Clean Catch [[id number]]  (Abnormal) Collected: 08/11/21 1422    Specimen: Urine, Clean Catch Updated: 08/11/21 1434     Color, UA Yellow     Appearance, UA Clear     pH, UA 6.5     Specific Gravity, UA 1.011     Glucose, UA Negative     Ketones, UA Negative     Bilirubin, UA Negative     Blood, UA Negative     Protein, UA Negative     Leuk Esterase, UA Negative     Nitrite, UA Negative     Urobilinogen, UA 2.0 E.U./dL    Narrative:      Urine microscopic not indicated.    TSH [[id number]]  (Abnormal) Collected: 08/11/21 0534    Specimen: Blood Updated: 08/11/21 1409     TSH 4.570 uIU/mL     Magnesium [[id number]]  (Abnormal) Collected: 08/11/21 0534    Specimen: Blood Updated: 08/11/21 0821     Magnesium 1.2 mg/dL     Comprehensive Metabolic Panel [[id number]]  (Abnormal) Collected: 08/11/21 0534    Specimen: Blood Updated: 08/11/21 0608     Glucose 91 mg/dL      BUN 5 mg/dL      Creatinine 0.56 mg/dL      Sodium 133 mmol/L      Potassium 3.9  mmol/L      Chloride 99 mmol/L      CO2 26.0 mmol/L      Calcium 9.2 mg/dL      Total Protein 6.0 g/dL      Albumin 3.20 g/dL      ALT (SGPT) 77 U/L      AST (SGOT) 91 U/L      Alkaline Phosphatase 97 U/L      Total Bilirubin 0.9 mg/dL      eGFR Non African Amer >150 mL/min/1.73      Globulin 2.8 gm/dL      A/G Ratio 1.1 g/dL      BUN/Creatinine Ratio 8.9     Anion Gap 8.0 mmol/L     Narrative:      GFR Normal >60  Chronic Kidney Disease <60  Kidney Failure <15      CBC (No Diff) [[id number]]  (Abnormal) Collected: 08/11/21 0534    Specimen: Blood Updated: 08/11/21 0552     WBC 6.77 10*3/mm3      RBC 4.27 10*6/mm3      Hemoglobin 14.4 g/dL      Hematocrit 42.4 %      MCV 99.3 fL      MCH 33.7 pg      MCHC 34.0 g/dL      RDW 14.9 %      RDW-SD 54.4 fl      MPV 9.7 fL      Platelets 140 10*3/mm3     Lipid Panel [[id number]]  (Abnormal) Collected: 08/09/21 0547    Specimen: Blood Updated: 08/09/21 0622     Total Cholesterol 158 mg/dL      Triglycerides 84 mg/dL      HDL Cholesterol 79 mg/dL      LDL Cholesterol  63 mg/dL      VLDL Cholesterol 16 mg/dL      LDL/HDL Ratio 0.79    Narrative:      Cholesterol Reference Ranges  (U.S. Department of Health and Human Services ATP III Classifications)    Desirable          <200 mg/dL  Borderline High    200-239 mg/dL  High Risk          >240 mg/dL      Triglyceride Reference Ranges  (U.S. Department of Health and Human Services ATP III Classifications)    Normal           <150 mg/dL  Borderline High  150-199 mg/dL  High             200-499 mg/dL  Very High        >500 mg/dL    HDL Reference Ranges  (U.S. Department of Health and Human Services ATP III Classifcations)    Low     <40 mg/dl (major risk factor for CHD)  High    >60 mg/dl ('negative' risk factor for CHD)        LDL Reference Ranges  (U.S. Department of Health and Human Services ATP III Classifcations)    Optimal          <100 mg/dL  Near Optimal     100-129 mg/dL  Borderline High  130-159 mg/dL  High              160-189 mg/dL  Very High        >189 mg/dL    CBC Auto Differential [[id number]]  (Abnormal) Collected: 08/09/21 0547    Specimen: Blood Updated: 08/09/21 0615     WBC 6.23 10*3/mm3      RBC 4.10 10*6/mm3      Hemoglobin 14.0 g/dL      Hematocrit 40.1 %      MCV 97.8 fL      MCH 34.1 pg      MCHC 34.9 g/dL      RDW 15.5 %      RDW-SD 55.4 fl      MPV 10.1 fL      Platelets 98 10*3/mm3      Neutrophil % 50.1 %      Lymphocyte % 32.7 %      Monocyte % 13.0 %      Eosinophil % 2.6 %      Basophil % 1.1 %      Immature Grans % 0.5 %      Neutrophils, Absolute 3.12 10*3/mm3      Lymphocytes, Absolute 2.04 10*3/mm3      Monocytes, Absolute 0.81 10*3/mm3      Eosinophils, Absolute 0.16 10*3/mm3      Basophils, Absolute 0.07 10*3/mm3      Immature Grans, Absolute 0.03 10*3/mm3      nRBC 0.0 /100 WBC     Hemoglobin A1c [[id number]]  (Normal) Collected: 08/09/21 0547    Specimen: Blood Updated: 08/09/21 0615     Hemoglobin A1C 4.80 %     Narrative:      Hemoglobin A1C Ranges:    Increased Risk for Diabetes  5.7% to 6.4%  Diabetes                     >= 6.5%  Diabetic Goal                < 7.0%    Extra Tubes [[id number]] Collected: 08/08/21 1320    Specimen: Blood, Venous Line Updated: 08/08/21 2345    Narrative:      The following orders were created for panel order Extra Tubes.  Procedure                               Abnormality         Status                     ---------                               -----------         ------                     Gold Top - SST[[id number]]                                   Final result                 Please view results for these tests on the individual orders.    Gold Top - SST [[id number]] Collected: 08/08/21 1320    Specimen: Blood Updated: 08/08/21 2345     Extra Tube Hold for add-ons.     Comment: Auto resulted.       Troponin [[id number]]  (Normal) Collected: 08/08/21 2022    Specimen: Blood Updated: 08/08/21 2055     Troponin T <0.010 ng/mL     Narrative:      Troponin T Reference  Range:  <= 0.03 ng/mL-   Negative for AMI  >0.03 ng/mL-     Abnormal for myocardial necrosis.  Clinicians would have to utilize clinical acumen, EKG, Troponin and serial changes to determine if it is an Acute Myocardial Infarction or myocardial injury due to an underlying chronic condition.       Results may be falsely decreased if patient taking Biotin.      Troponin [[id number]]  (Normal) Collected: 08/08/21 1713    Specimen: Blood Updated: 08/08/21 1736     Troponin T <0.010 ng/mL     Narrative:      Troponin T Reference Range:  <= 0.03 ng/mL-   Negative for AMI  >0.03 ng/mL-     Abnormal for myocardial necrosis.  Clinicians would have to utilize clinical acumen, EKG, Troponin and serial changes to determine if it is an Acute Myocardial Infarction or myocardial injury due to an underlying chronic condition.       Results may be falsely decreased if patient taking Biotin.      Potassium [[id number]]  (Abnormal) Collected: 08/08/21 1713    Specimen: Blood Updated: 08/08/21 1731     Potassium 2.8 mmol/L     Urine Drug Screen - Urine, Clean Catch [[id number]]  (Normal) Collected: 08/08/21 1452    Specimen: Urine, Clean Catch Updated: 08/08/21 1528     THC, Screen, Urine Negative     Phencyclidine (PCP), Urine Negative     Cocaine Screen, Urine Negative     Methamphetamine, Ur Negative     Opiate Screen Negative     Amphetamine Screen, Urine Negative     Benzodiazepine Screen, Urine Negative     Tricyclic Antidepressants Screen Negative     Methadone Screen, Urine Negative     Barbiturates Screen, Urine Negative     Oxycodone Screen, Urine Negative     Propoxyphene Screen Negative     Buprenorphine, Screen, Urine Negative    Narrative:      Cutoff For Drugs Screened:    Amphetamines               500 ng/ml  Barbiturates               200 ng/ml  Benzodiazepines            150 ng/ml  Cocaine                    150 ng/ml  Methadone                  200 ng/ml  Opiates                    100 ng/ml  Phencyclidine                25 ng/ml  THC                            50 ng/ml  Methamphetamine            500 ng/ml  Tricyclic Antidepressants  300 ng/ml  Oxycodone                  100 ng/ml  Propoxyphene               300 ng/ml  Buprenorphine               10 ng/ml    The normal value for all drugs tested is negative. This report includes unconfirmed screening results, with the cutoff values listed, to be used for medical treatment purposes only.  Unconfirmed results must not be used for non-medical purposes such as employment or legal testing.  Clinical consideration should be applied to any drug of abuse test, particularly when unconfirmed results are used.      COVID-19 and FLU A/B PCR - Swab, Nasopharynx [[id number]]  (Normal) Collected: 08/08/21 1309    Specimen: Swab from Nasopharynx Updated: 08/08/21 1350     COVID19 Not Detected     Influenza A PCR Not Detected     Influenza B PCR Not Detected    Narrative:      Fact sheet for providers: https://www.fda.gov/media/159175/download    Fact sheet for patients: https://www.fda.gov/media/465775/download    Test performed by PCR.    Ethanol [[id number]] Collected: 08/08/21 1320    Specimen: Blood Updated: 08/08/21 1349     Ethanol <10 mg/dL      Ethanol % <0.010 %     BNP [[id number]]  (Normal) Collected: 08/08/21 1320    Specimen: Blood Updated: 08/08/21 1347     proBNP 175.1 pg/mL     Narrative:      Among patients with dyspnea, NT-proBNP is highly sensitive for the detection of acute congestive heart failure. In addition NT-proBNP of <300 pg/ml effectively rules out acute congestive heart failure with 99% negative predictive value.    Results may be falsely decreased if patient taking Biotin.      Protime-INR [[id number]]  (Normal) Collected: 08/08/21 1320    Specimen: Blood Updated: 08/08/21 1343     Protime 12.5 Seconds      INR 0.94    Narrative:      Therapeutic range for most indications is 2.0-3.0 INR,  or 2.5-3.5 for mechanical heart valves.    aPTT [[id number]]  (Normal) Collected:  08/08/21 1320    Specimen: Blood Updated: 08/08/21 1343     PTT 29.7 seconds     Narrative:      The recommended Heparin therapeutic range is 68-97 seconds.    D-dimer, Quantitative [[id number]]  (Abnormal) Collected: 08/08/21 1320    Specimen: Blood Updated: 08/08/21 1343     D-Dimer, Quantitative 1,400 ng/mL (FEU)     Narrative:      Dimer values <500 ng/ml FEU are FDA approved as aid in diagnosis of deep venous thrombosis and pulmonary embolism.  This test should not be used in an exclusion strategy with pretest probability alone.    A recent guideline regarding diagnosis for pulmonary thromboembolism recommends an adjusted exclusion criterion of age x 10 ng/ml FEU for patients >50 years of age ([employer] Intern Med 2015; 163: 701-711).      CBC & Differential [[id number]]  (Abnormal) Collected: 08/08/21 1320    Specimen: Blood Updated: 08/08/21 1329    Narrative:      The following orders were created for panel order CBC & Differential.  Procedure                               Abnormality         Status                     ---------                               -----------         ------                     CBC Auto Differential[[id number]]        Abnormal            Final result                 Please view results for these tests on the individual orders.    CBC Auto Differential [[id number]]  (Abnormal) Collected: 08/08/21 1320    Specimen: Blood Updated: 08/08/21 1329     WBC 8.16 10*3/mm3      RBC 4.58 10*6/mm3      Hemoglobin 15.5 g/dL      Hematocrit 44.3 %      MCV 96.7 fL      MCH 33.8 pg      MCHC 35.0 g/dL      RDW 15.4 %      RDW-SD 55.5 fl      MPV 9.7 fL      Platelets 93 10*3/mm3      Neutrophil % 68.0 %      Lymphocyte % 15.2 %      Monocyte % 15.3 %      Eosinophil % 0.2 %      Basophil % 0.9 %      Immature Grans % 0.4 %      Neutrophils, Absolute 5.55 10*3/mm3      Lymphocytes, Absolute 1.24 10*3/mm3      Monocytes, Absolute 1.25 10*3/mm3      Eosinophils, Absolute 0.02 10*3/mm3      Basophils, Absolute  0.07 10*3/mm3      Immature Grans, Absolute 0.03 10*3/mm3      nRBC 0.0 /100 WBC         Imaging Results (Most Recent)     Procedure Component Value Units Date/Time    CT Angiogram Chest [[id number]] Collected: 08/08/21 1344     Updated: 08/08/21 1439    Narrative:      CT angiogram of the chest with contrast    History:  Chest pain. Hypertension.    Axial spiral scans of the chest were obtained  with  intravenous  contrast.  Coronal and sagittal reconstructions and both oblique  coronal MIPS obtained the same workstation.    This exam was performed according to our departmental  dose-optimization program, which includes automated exposure  control, adjustment of the mA and/or kV according to patient size  and/or use of iterative reconstruction technique.    DLP: 237.10    Comparison: None    Findings:  No pulmonary embolism.  No mediastinal hematoma.  No hilar, mediastinal or axillary adenopathy.  No thoracic aortic aneurysm or dissection.  No pericardial effusion.    The lungs are clear of an acute process.  No mass or noncalcified nodule.  No pleural effusion.  No pneumothorax.    Fatty infiltration of the liver.  Distended gallbladder.  Questionable cholelithiasis.  1.8 cm left renal cyst    No acute osseous abnormality.  Old compression deformities thoracic spine.  Degenerative changes and degenerative disc disease thoracic  spine.  Deformity right humeral head which may be related prior trauma.      Impression:      Conclusion:  No Pulmonary Embolism.  Fatty infiltration of the liver.  Distended gallbladder.  Questionable cholelithiasis.  1.8 cm left renal cyst    [id number]    Electronically signed by:  Danial Mckinney MD  8/8/2021 2:37 PM CDT  Workstation: [id number] Chest 1 View [[id number]] Collected: 08/08/21 1238     Updated: 08/08/21 1312    Narrative:        PORTABLE CHEST    HISTORY: Chest pain    Portable AP film of the chest was obtained at 12:57 PM.  COMPARISON: None    FINDINGS:   EKG leads.  The  lungs are clear of an acute process.  The heart is not enlarged.  The pulmonary vasculature is not increased.  No pleural effusion.  No pneumothorax.  No acute osseous abnormality.  Previously demonstrated displaced avulsion fracture lateral  aspect of the right humeral head.  Old left rib fractures.  Calcific tendinitis or bursitis left shoulder.  Old fracture left clavicle.      Impression:      CONCLUSION:  No acute cardiopulmonary disease.  Previously demonstrated displaced avulsion fracture lateral  aspect of the right humeral head.  Old left rib fractures.  Calcific tendinitis or bursitis left shoulder.  Old fracture left clavicle.    [id number]    Electronically signed by:  Danial Mckinney MD  8/8/2021 1:11 PM CDT  Workstation: [id number]    XR Humerus Right [[id number]] Collected: 08/08/21 1239     Updated: 08/08/21 1310    Narrative:        Two view right humerus    HISTORY: Arm pain. Fell.    AP and lateral views obtained.    COMPARISON: Right shoulder August 5, 2021.    FINDINGS:   Previously demonstrated displaced avulsion fracture lateral  aspect of the humeral head.  No dislocation.  No other osseous or articular abnormality.      Impression:      CONCLUSION:  Previously demonstrated displaced avulsion fracture lateral  aspect of the humeral head.    [id number]    Electronically signed by:  Danial Mckinney MD  8/8/2021 1:08 PM CDT  Workstation: [id number]    XR Shoulder 2+ View Right [[id number]] Collected: 08/08/21 1238     Updated: 08/08/21 1308    Narrative:        Three view right shoulder    HISTORY: Shoulder pain. Fell.    AP films with the humerus in internal and external rotation and  scapular Y view were obtained.    COMPARISON: August 5, 2021    FINDINGS:   Previously demonstrated displaced avulsion fracture lateral  aspect of the humeral head.  Hypertrophic change acromioclavicular joint.  No fracture or dislocation.  No other osseous or articular abnormality.      Impression:       "CONCLUSION:  Previously demonstrated displaced avulsion fracture lateral  aspect of the humeral head.  Hypertrophic change acromioclavicular joint.    [id number]    Electronically signed by:  Danial Mckinney MD  8/8/2021 1:07 PM CDT  Workstation: [id number]          Chief Complaint on Day of Discharge: alcohol detoxification and alcohol rehab program.     Hospital Course:  The patient is a 50 y.o. male who presented to [facility] with came in with alcohol intoxicaiton with fall and fracture of left elbow and wrist and needed help trying to access an alcohol rehab program and has been homeless most of the last fewmonths. He was admitted and wished to go to alcohol rehab program with [facility] and he was agreeable to going and however, he did not meet criteria and then he needed to go to the program without a hospital transfer.     Condition on Discharge:  Stable and he is of sound mind and sound body and good decision making ability and wants to go home and wants to go home with his brother while waiting on sister from [place] to come pick him  Up.      He states he has a brother here locally to come pick him up and he knows he has a place to go to and stay and to be safe and I have his sister's cell phone number to call and check up on him and make sure that he is safe also.     He is again of sound mind and sound body and able to make his own decisions and he is not an invalid and he is not an at risk adult     Physical Exam on Discharge:  /97 (BP Location: Left arm, Patient Position: Sitting)   Pulse 66   Temp 97.5 °F (36.4 °C) (Oral)   Resp 18   Ht 180.3 cm (71\")   Wt 80.1 kg (176 lb 8 oz)   SpO2 98%   BMI 24.62 kg/m²   Physical Exam  Oral no thrush   cv rrr  Lungs coarse and good breath sounds   abd bowel sounds present   Ext no edema   Neuro non focal   Mental status intact  Able to ambulate  Pain along elbow controlled   Use tylenol and / or aleve to control pain       Discharge " Disposition:  Home or Self Care    Discharge Medications:     Discharge Medications      New Medications      Instructions Start Date   Metoprolol Tartrate 75 MG tablet   100 mg, Oral, 2 Times Daily      potassium chloride 10 MEQ CR capsule  Commonly known as: MICRO-K   20 mEq, Oral, Daily             Discharge Diet: regular please do not drink alcohol     Activity at Discharge: as tolerated     Discharge Care Plan/Instructions: follow up for alcohol rehab    Follow-up Appointments:   No future appointments.    Test Results Pending at Discharge: Jake Kerr MD    Time: greater than 30 min

## 2021-08-12 NOTE — OUTREACH NOTE
Prep Survey      Responses   [facility] patient discharged from?  [place]   Is LACE score < 7 ?  Yes   Emergency Room discharge w/ pulse ox?  No   Eligibility  [facility]   Date of Admission  08/08/21   Date of Discharge  08/12/21   Discharge Disposition  Home or Self Care   Discharge diagnosis  alcohol intoxicaiton with fall and fracture of left elbow and wrist    Does the patient have one of the following disease processes/diagnoses(primary or secondary)?  Other   Does the patient have Home health ordered?  No   Is there a DME ordered?  No   General alerts for this patient  [facility]   Prep survey completed?  Yes          Dai Garcia RN

## 2021-08-12 NOTE — THERAPY TREATMENT NOTE
Acute Care - Physical Therapy Treatment Note  [facility]     Patient Name: Hitesh Rodarte  : 1970  MRN: [id number]  Today's Date: 2021      Visit Dx:     ICD-10-CM ICD-9-CM   1. Chest pain, unspecified type  R07.9 786.50   2. Closed displaced fracture of greater tuberosity of right humerus, initial encounter  S42.251A 812.03   3. Hypokalemia  E87.6 276.8   4. Hypertensive urgency  I16.0 401.9   5. Impaired mobility and ADLs  Z74.09 V49.89    Z78.9    6. Impaired functional mobility, balance, gait, and endurance  Z74.09 V49.89     Patient Active Problem List   Diagnosis   • Chest pain     Past Medical History:   Diagnosis Date   • Alcohol abuse    • Hypertension      History reviewed. No pertinent surgical history.     PT Assessment (last 12 hours)      PT Evaluation and Treatment     Row Name 21 1002          Physical Therapy Time and Intention    Subjective Information  no complaints  -RW     Document Type  therapy note (daily note)  -RW     Mode of Treatment  physical therapy  -RW     Patient Effort  good  -RW     Row Name 21 1002          General Information    Patient Profile Reviewed  yes  -RW     Existing Precautions/Restrictions  non-weight bearing NWB RUE; RUE in sling  -RW     Row Name 21 1002          Cognition    Affect/Mental Status (Cognitive)  WFL  -RW     Orientation Status (Cognition)  oriented x 4  -RW     Row Name 21 1002          Pain Scale: Numbers Pre/Post-Treatment    Pretreatment Pain Rating  2/10  -RW     Posttreatment Pain Rating  2/10  -RW     Row Name 21 1002          Mobility    Extremity Weight-bearing Status  right upper extremity  -RW     Right Upper Extremity (Weight-bearing Status)  non weight-bearing (NWB)  -RW     Row Name 21 1002          Bed Mobility    Bed Mobility  supine-sit;sit-supine  -RW     Supine-Sit [place] (Bed Mobility)  standby assist  -RW     Assistive Device (Bed Mobility)  bed rails;head of bed elevated   Patient's daughter/POA contacts the clinic stating [facility] was supposed to fax refill requests to the clinic last week. Patient is now out of one of her medications and daughter is questioning if this was done.    Informed daughter that writer does not see any refill requests unfortunately. Daughter is requesting patient's donepezil, memantine, and escitalopram be sent to [facility]. Will need a one month supply sent to [facility] in [place].     Patient had been receiving her memantine from Dr Frances but she is no longer following with this provider as daughter reports that Dr Frances indicated she no longer needed to see the patient. After reviewing the patient's chart, it appears the patient did not want to be seen for regular follow-up. Provider had increased patient's memantine to 10 mg BID at that office visit.     Routed to PCP to advise if ok to take over prescribing memantine.    -     Row Name 08/12/21 1002          Transfers    Transfers  sit-stand transfer;stand-sit transfer;toilet transfer  -RW     Sit-Stand [place] (Transfers)  supervision  -RW     Stand-Sit [place] (Transfers)  supervision  -RW     [place] Level (Toilet Transfer)  --  -     Row Name 08/12/21 1002          Toilet Transfer    Type (Toilet Transfer)  --  -     Row Name 08/12/21 1002          Gait/Stairs (Locomotion)    Gait/Stairs Locomotion  gait/ambulation independence;gait/ambulation assistive device;distance ambulated;gait pattern;gait deviations;maintains weight-bearing status;stairs negotiation  -     [place] Level (Gait)  contact guard;standby assist  -RW     Assistive Device (Gait)  -- pushing iv pole  -RW     Distance in Feet (Gait)  136,158'  -RW     Pattern (Gait)  step-through  -RW     Deviations/Abnormal Patterns (Gait)  gait speed decreased;base of support, wide  -[facility] Name 08/12/21 1002          Safety Issues, Functional Mobility    Impairments Affecting Function (Mobility)  balance;endurance/activity tolerance;pain;strength  -[facility] Name 08/12/21 1002          Motor Skills    Therapeutic Exercise  hip;knee;ankle  -RW     Additional Documentation  -- sit to stand x 5  -[facility] Name 08/12/21 1002          Hip (Therapeutic Exercise)    Hip (Therapeutic Exercise)  strengthening exercise;AROM (active range of motion)  -RW     Hip Strengthening (Therapeutic Exercise)  marching while seated;10 repetitions;5 repetitions;heel slides  -[facility] Name 08/12/21 1002          Knee (Therapeutic Exercise)    Knee (Therapeutic Exercise)  strengthening exercise  -     Knee Strengthening (Therapeutic Exercise)  LAQ (long arc quad);10 repetitions;5 repetitions;2 sets  -     Row Name 08/12/21 1002          Vital Signs    Pre Systolic BP Rehab  149  -RW     Pre Treatment Diastolic BP  65  -RW     Pretreatment Heart Rate (beats/min)  65  -[facility] Name 08/12/21 1002          Bed  Mobility Goal 1 (PT)    Activity/Assistive Device (Bed Mobility Goal 1, PT)  sit to supine;supine to sit  -RW     [place] Level/Cues Needed (Bed Mobility Goal 1, PT)  independent  -RW     Time Frame (Bed Mobility Goal 1, PT)  3 days  -RW     Progress/Outcomes (Bed Mobility Goal 1, PT)  goal not met  -RW     Row Name 08/12/21 1002          Transfer Goal 1 (PT)    Activity/Assistive Device (Transfer Goal 1, PT)  sit-to-stand/stand-to-sit;bed-to-chair/chair-to-bed  -RW     [place] Level/Cues Needed (Transfer Goal 1, PT)  modified independence  -RW     Time Frame (Transfer Goal 1, PT)  4 days  -RW     Progress/Outcome (Transfer Goal 1, PT)  goal not met  -RW     Row Name 08/12/21 1002          Gait Training Goal 1 (PT)    Activity/Assistive Device (Gait Training Goal 1, PT)  gait (walking locomotion);assistive device use;decrease fall risk;increase endurance/gait distance  -RW     [place] Level (Gait Training Goal 1, PT)  modified independence  -RW     Distance (Gait Training Goal 1, PT)  50ft or more  -RW     Time Frame (Gait Training Goal 1, PT)  by discharge  -RW     Progress/Outcome (Gait Training Goal 1, PT)  goal partially met  -RW     Row Name 08/12/21 1002          Positioning and Restraints    Pre-Treatment Position  in bed  -RW     Post Treatment Position  bed  -RW       User Key  (r) = Recorded By, (t) = Taken By, (c) = Cosigned By    Initials Name Provider Type    RW Francisco Javier Contreras, PTA Physical Therapy Assistant        Physical Therapy Education                 Title: PT OT SLP Therapies (In Progress)     Topic: Physical Therapy (In Progress)     Point: Mobility training (In Progress)     Learning Progress Summary           Patient Acceptance, E, NR by PIEDAD at 8/10/2021 1324    Acceptance, E, VU by RONNY at 8/9/2021 1437    Comment: Role of PT, POC, use of gait belt                   Point: Home exercise program (Not Started)     Learner Progress:  Not documented in this visit.          Point:  Body mechanics (Not Started)     Learner Progress:  Not documented in this visit.          Point: Precautions (Done)     Learning Progress Summary           Patient Acceptance, E, VU by RONNY at 8/9/2021 1907    Comment: Role of PT, POC, use of gait belt                               User Key     Initials Effective Dates Name Provider Type Discipline     06/16/21 -  Vinny Workman PTA Physical Therapy Assistant PT    RONNY 06/16/21 -  Mana Steve, PT Physical Therapist PT              PT Recommendation and Plan     Plan of Care Reviewed With: patient  Progress: improving  Outcome Summary: pt in bed but agreeable. trnasfers with sba and amb pushing iv pole sba/cga 136, 158 then performed seated therex and sup therex.  Outcome Measures     Row Name 08/10/21 0956             How much help from another person do you currently need...    Turning from your back to your side while in flat bed without using bedrails?  3  -PIEDAD      Moving from lying on back to sitting on the side of a flat bed without bedrails?  3  -PIEDAD      Moving to and from a bed to a chair (including a wheelchair)?  3  -PIEDAD      Standing up from a chair using your arms (e.g., wheelchair, bedside chair)?  3  -PIEDAD      Climbing 3-5 steps with a railing?  3  -PIEDAD      To walk in hospital room?  3  -PIEDAD      AM-PAC 6 Clicks Score (PT)  18  -         Functional Assessment    Outcome Measure Options  AM-PAC 6 Clicks Basic Mobility (PT)  -        User Key  (r) = Recorded By, (t) = Taken By, (c) = Cosigned By    Initials Name Provider Type     Vinny Workman PTA Physical Therapy Assistant           Time Calculation:   PT Charges     Row Name 08/12/21 1241             Time Calculation    Start Time  1002  -RW      Stop Time  1041  -RW      Time Calculation (min)  39 min  -RW         Time Calculation- PT    Total Timed Code Minutes- PT  39 minute(s)  -RW         Timed Charges    95756 - PT Therapeutic Exercise Minutes  10  -RW      59341 - Gait Training  Minutes   20  -RW      08964 - PT Therapeutic Activity Minutes  9  -RW         Total Minutes    Timed Charges Total Minutes  39  -RW       Total Minutes  39  -RW        User Key  (r) = Recorded By, (t) = Taken By, (c) = Cosigned By    Initials Name Provider Type    Francisco Javier Zamudio PTA Physical Therapy Assistant        Therapy Charges for Today     Code Description Service Date Service Provider Modifiers Qty    45421142672 HC GAIT TRAINING EA 15 MIN 8/11/2021 Francisco Javier Contreras, KEARA GP 1    88784576617 HC PT THERAPEUTIC ACT EA 15 MIN 8/11/2021 Francisco Javier Contreras, PTA GP 1    28387497873 HC PT THER PROC EA 15 MIN 8/12/2021 Francisco Javier Contreras, PTA GP 1    68889892802 HC GAIT TRAINING EA 15 MIN 8/12/2021 Francisco Javier Contreras, PTA GP 1    59782965824 HC PT THERAPEUTIC ACT EA 15 MIN 8/12/2021 Francisco Javier Contreras, PTA GP 1          PT G-Codes  Outcome Measure Options: AM-PAC 6 Clicks Basic Mobility (PT)  AM-PAC 6 Clicks Score (PT): 18  AM-PAC 6 Clicks Score (OT): 14    Francisco Javier Contreras PTA  8/12/2021

## 2021-08-12 NOTE — ED TRIAGE NOTES
Pt presents to ED via EMS wanting help with alcohol problem. Pt reports being scared of having a seizure from alcohol withdrawal. Pt reports last drink was 2 hours ago.

## 2021-08-12 NOTE — PLAN OF CARE
Goal Outcome Evaluation:  Plan of Care Reviewed With: patient        Progress: improving  Outcome Summary: pt in bed but agreeable. trnasfers with sba and amb pushing iv pole sba/cga 136, 158 then performed seated therex and sup therex.

## 2021-08-13 ENCOUNTER — TRANSITIONAL CARE MANAGEMENT TELEPHONE ENCOUNTER (OUTPATIENT)
Dept: CALL CENTER | Facility: HOSPITAL | Age: 51
End: 2021-08-13

## 2021-08-13 NOTE — OUTREACH NOTE
Call Center TCM Note      Responses   [facility] patient discharged from?  [place]   Does the patient have one of the following disease processes/diagnoses(primary or secondary)?  Other   TCM attempt successful?  No   Unsuccessful attempts  Attempt 2          Vandana Starr RN    8/13/2021, 10:43 EDT

## 2021-08-13 NOTE — ED NOTES
"Pt states that he was discharged earlier today. He had a fall and fractured his right shoulder. He is now back because he is \"scared that he is going to have a seizure\". He states that he is a chronic alcoholic and he is trying to find a place to get treatment. He is homeless and does not have a primary care physician either. He states that he spoke with a  yesterday and was told that he does not meet criteria to receive assistance. His last drink was at 1500. He typically drinks at least a pint of alcohol a day.      Cat Stack, RN  08/12/21 2112    "

## 2021-08-13 NOTE — ED PROVIDER NOTES
Subjective   50-year-old male presents to the emergency department with concern for possible alcohol withdrawal.  He has been seen in this hospital recently twice.  Just discharged this morning after stay for chest pain.  Reports that he was supposed to stay with his brother until his sister could take him to rehab but he and his brother got into an argument and now he has nowhere to stay.  He has drank since he has been discharged.  He was here for 4 to 5 days and did not drink while he was here.  He received multiple doses of Ativan.  He reports after the argument with his brother he did become very scared.  Denies any chest pain or shortness of breath at this time.  He denies being tremulous but reports he is anxious.  He is concerned that he might has a seizure as he has had one in the past when coming off of alcohol.  Currently does not have a plan is to have to get to rehab or a plan of where to stay this evening.    Family history, surgical history, social history, current medications and allergies are reviewed with the patient and triage documentation and vitals are reviewed.      History provided by:  Patient   used: No        Review of Systems   Constitutional: Negative for chills and fever.   HENT: Negative for congestion and sore throat.    Eyes: Negative for photophobia and visual disturbance.   Respiratory: Negative for cough, shortness of breath and wheezing.    Cardiovascular: Negative for chest pain, palpitations and leg swelling.   Gastrointestinal: Negative for abdominal pain, nausea and vomiting.   Endocrine: Negative for polydipsia, polyphagia and polyuria.   Genitourinary: Negative for dysuria, frequency and urgency.   Musculoskeletal: Negative for arthralgias, back pain, myalgias and neck pain.   Skin: Negative for rash and wound.   Allergic/Immunologic: Negative.    Neurological: Negative for tremors, speech difficulty, light-headedness, numbness and headaches.    Hematological: Negative.    Psychiatric/Behavioral: Negative for confusion. The patient is nervous/anxious.        Past Medical History:   Diagnosis Date   • Alcohol abuse    • Hypertension        No Known Allergies    History reviewed. No pertinent surgical history.    History reviewed. No pertinent family history.    Social History     Socioeconomic History   • Marital status: Single     Spouse name: Not on file   • Number of children: Not on file   • Years of education: Not on file   • Highest education level: Not on file   Tobacco Use   • Smoking status: Current Every Day Smoker     Packs/day: 1.50     Types: Cigarettes   • Smokeless tobacco: Never Used   Vaping Use   • Vaping Use: Never used   Substance and Sexual Activity   • Alcohol use: Yes     Comment: pint and 6 pack daily   • Drug use: Not Currently           Objective   Physical Exam  Vitals and nursing note reviewed.   Constitutional:       Appearance: Normal appearance.   HENT:      Head: Normocephalic and atraumatic.      Mouth/Throat:      Mouth: Mucous membranes are moist.      Pharynx: Oropharynx is clear.   Eyes:      Conjunctiva/sclera: Conjunctivae normal.      Pupils: Pupils are equal, round, and reactive to light.   Cardiovascular:      Rate and Rhythm: Normal rate and regular rhythm.      Pulses: Normal pulses.   Pulmonary:      Effort: Pulmonary effort is normal.      Breath sounds: Normal breath sounds.   Abdominal:      General: Bowel sounds are normal.      Palpations: Abdomen is soft.   Musculoskeletal:      Cervical back: Normal range of motion.   Skin:     General: Skin is warm and dry.      Capillary Refill: Capillary refill takes less than 2 seconds.   Neurological:      General: No focal deficit present.      Mental Status: He is alert and oriented to person, place, and time.      Motor: No tremor.   Psychiatric:         Attention and Perception: Attention normal.         Mood and Affect: Mood is anxious.         Speech: Speech  normal.         Behavior: Behavior is cooperative.         Thought Content: Thought content does not include homicidal or suicidal ideation.         Cognition and Memory: Cognition and memory normal.         Procedures  none         ED Course      Labs Reviewed   COMPREHENSIVE METABOLIC PANEL - Abnormal; Notable for the following components:       Result Value    Creatinine 0.70 (*)     Sodium 132 (*)     ALT (SGPT) 91 (*)     AST (SGOT) 107 (*)     All other components within normal limits    Narrative:     GFR Normal >60  Chronic Kidney Disease <60  Kidney Failure <15     CBC WITH AUTO DIFFERENTIAL - Abnormal; Notable for the following components:    .2 (*)     MCH 34.3 (*)     RDW 15.5 (*)     RDW-SD 57.5 (*)     Monocyte % 14.3 (*)     Immature Grans % 0.6 (*)     Monocytes, Absolute 1.41 (*)     Immature Grans, Absolute 0.06 (*)     All other components within normal limits   CBC AND DIFFERENTIAL    Narrative:     The following orders were created for panel order CBC & Differential.  Procedure                               Abnormality         Status                     ---------                               -----------         ------                     CBC Auto Differential[[id number]]        Abnormal            Final result                 Please view results for these tests on the individual orders.   EXTRA TUBES    Narrative:     The following orders were created for panel order Extra Tubes.  Procedure                               Abnormality         Status                     ---------                               -----------         ------                     Gold Top - SST[[id number]]                                   Final result                 Please view results for these tests on the individual orders.   GOLD TOP - SST     XR Shoulder 2+ View Right    Result Date: 8/8/2021  Narrative: Three view right shoulder HISTORY: Shoulder pain. Fell. AP films with the humerus in internal and external  rotation and scapular Y view were obtained. COMPARISON: August 5, 2021 FINDINGS: Previously demonstrated displaced avulsion fracture lateral aspect of the humeral head. Hypertrophic change acromioclavicular joint. No fracture or dislocation. No other osseous or articular abnormality.     Impression: CONCLUSION: Previously demonstrated displaced avulsion fracture lateral aspect of the humeral head. Hypertrophic change acromioclavicular joint. [id number] Electronically signed by:  Danial Mckinney MD  8/8/2021 1:07 PM CDT Workstation: [id number]    XR Shoulder 2+ View Right    Addendum Date: 8/5/2021 Addendum:    ADDENDUM ADDENDUM #1 Addendum: Referring clinician is aware of findings. Electronically signed by:  Dave Garrison MD  8/5/2021 1:31 PM CDT Workstation: [id number]     Result Date: 8/5/2021  Narrative: PROCEDURE:  Right  Shoulder 4  views. REASON FOR EXAM:  fall FINDINGS: Along the superior aspect of the right humeral head there is an irregular shaped bony fragment which measures 1.33 x 1.14 cm. The donor site for this fragment appears to arise from the right humeral head superior laterally. This is most consistent with an avulsion fracture in this region. Otherwise bony and soft tissue structures of the right shoulder unremarkable.     Impression: 1.  Along the superior aspect of the right humeral head there is an irregular shaped bony fragment which measures 1.33 x 1.14 cm. The donor site for this fragment appears to arise from the right humeral head superior laterally. This is most consistent with an avulsion fracture in this region. 2.  Remainder right shoulder exam is unremarkable. Electronically signed by:  Dave Garrison MD  8/5/2021 1:12 PM CDT Workstation: [id number]    XR Humerus Right    Result Date: 8/8/2021  Narrative: Two view right humerus HISTORY: Arm pain. Fell. AP and lateral views obtained. COMPARISON: Right shoulder August 5, 2021. FINDINGS: Previously demonstrated displaced avulsion fracture  lateral aspect of the humeral head. No dislocation. No other osseous or articular abnormality.     Impression: CONCLUSION: Previously demonstrated displaced avulsion fracture lateral aspect of the humeral head. [id number] Electronically signed by:  Danial Mckinney MD  8/8/2021 1:08 PM CDT Workstation: [id number]    CT Head Without Contrast    Result Date: 8/5/2021  Narrative: PROCEDURE: CT head without contrast. INDICATION: Syncope versus seizure. COMPARISON: None. Total DLP 1046.4 mGy-cm. TECHNIQUE: This exam was performed according to our departmental dose-optimization program, which includes automated exposure control, adjustment of the mA and/or kV according to patient size and/or use of iterative reconstruction technique. CT head without contrast performed with axial and reconstructed sagittal and coronal images. FINDINGS: Bony calvarium intact. 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 The ethmoid and sphenoid and visualized portion left maxillary sinus clear. Mild mucosal thickening right maxillary sinus. Mastoid air cells are clear. There are vascular calcifications bilaterally in the internal carotid arteries in the upper intracavernous portion right greater than left. Calcifications on the right internal carotid in the upper intracavernous area suggests there may be dilatation of the internal carotid in this area. Cannot exclude either asymmetric plaque versus abnormal aneurysmal dilatation. Further evaluation with CT angiography would be helpful in further evaluating this area. No abnormal extra-axial fluid collections. No intracranial bleed. No mass effect or midline shift. No abnormal focal areas of hypodensity or acute ischemic change. Moderate diffuse atrophy with symmetrical prominence of the sulci, sylvian fissures and ventricles.     Impression: Vascular calcifications in the internal carotid arteries right greater than left bilaterally in their upper intracavernous portion. Findings on the right  suggests possible abnormal dilatation or aneurysmal dilatation of the right internal carotid artery in the upper intracavernous region. Further evaluation with CT angiography would be best means of further clarify this finding. Moderate diffuse atrophy with no acute ischemic change or intracranial bleed. Findings and recommendations personally discussed with the OMAR Gale at the time of this dictation at 3:59 PM CDT. [id number] Electronically signed by:  Yoni Luz MD  8/5/2021 4:00 PM CDT Workstation: [id number]    XR Chest 1 View    Result Date: 8/8/2021  Narrative: PORTABLE CHEST HISTORY: Chest pain Portable AP film of the chest was obtained at 12:57 PM. COMPARISON: None FINDINGS: EKG leads. The lungs are clear of an acute process. The heart is not enlarged. The pulmonary vasculature is not increased. No pleural effusion. No pneumothorax. No acute osseous abnormality. Previously demonstrated displaced avulsion fracture lateral aspect of the right humeral head. Old left rib fractures. Calcific tendinitis or bursitis left shoulder. Old fracture left clavicle.     Impression: CONCLUSION: No acute cardiopulmonary disease. Previously demonstrated displaced avulsion fracture lateral aspect of the right humeral head. Old left rib fractures. Calcific tendinitis or bursitis left shoulder. Old fracture left clavicle. [id number] Electronically signed by:  Danial Mckinney MD  8/8/2021 1:11 PM CDT Workstation: [id number]    CT Angiogram Carotids    Result Date: 8/5/2021  Narrative: PROCEDURE: CT NECK ANGIOGRAPHY WITH IV CONTRAST CLINICAL HISTORY: see ct head results. COMPARISON: CT head without contrast performed the same date. TECHNIQUE: CT angiography of the carotid regions was performed with intravenous contrast on the level of the aortic arch to the skull base in orthogonal planes, along with 3D reconstruction of the arterial vasculature of the head and neck from the source images. Measurement of carotid stenosis  is based on NASCET criteria which calculates the percentage of stenosis relative to the luminal diameter of the normal carotid artery distal to the stenosis. CONTRAST: 90 mL IV Isovue 370 This exam was performed using radiation doses that are as low as reasonably achievable (ALARA). This exam was performed according to our departmental dose optimization program, which includes automated exposure control, adjustment of the mA and/or KV according to patient size and/or use of iterative reconstruction technique. FINDINGS: There is no hemodynamically significant stenosis or dissection in the bilateral common carotid, visualized portions of the bilateral proximal internal carotid or the visualized portions of the bilateral external carotid arteries. The carotid bulbs contain mild atherosclerotic calcification without significant stenosis. There is normal takeoff of the great vessels from the aortic arch. The bilateral vertebral arteries appear patent with no evidence of dissection on either side.  The basilar tip appears normal. Limited evaluation of the dural sinuses and the intracranial and venous system shows a widely patent superior sagittal sinus, straight sinus and bilateral internal cerebral veins. The lung apices appear clear. The cervical spine contains degenerative changes at C5-C6. There is mild mucoperiosteal thickening of the right maxillary sinus.     Impression: No significant carotid stenosis. Mild right maxillary sinus disease. Electronically signed by:  Facundo Jenkins MD  8/5/2021 5:59 PM CDT Workstation: [id number]    CT Angiogram Chest    Result Date: 8/8/2021  Narrative: CT angiogram of the chest with contrast History:  Chest pain. Hypertension. Axial spiral scans of the chest were obtained  with  intravenous contrast.  Coronal and sagittal reconstructions and both oblique coronal MIPS obtained the same workstation. This exam was performed according to our departmental dose-optimization program,  which includes automated exposure control, adjustment of the mA and/or kV according to patient size and/or use of iterative reconstruction technique. DLP: 237.10 Comparison: None Findings: No pulmonary embolism. No mediastinal hematoma. No hilar, mediastinal or axillary adenopathy. No thoracic aortic aneurysm or dissection. No pericardial effusion. The lungs are clear of an acute process. No mass or noncalcified nodule. No pleural effusion. No pneumothorax. Fatty infiltration of the liver. Distended gallbladder. Questionable cholelithiasis. 1.8 cm left renal cyst No acute osseous abnormality. Old compression deformities thoracic spine. Degenerative changes and degenerative disc disease thoracic spine. Deformity right humeral head which may be related prior trauma.     Impression: Conclusion: No Pulmonary Embolism. Fatty infiltration of the liver. Distended gallbladder. Questionable cholelithiasis. 1.8 cm left renal cyst [id number] Electronically signed by:  Danial Mckinney MD  8/8/2021 2:37 PM CDT Workstation: [email or web address]  Number of Diagnoses or Management Options     Amount and/or Complexity of Data Reviewed  Clinical lab tests: reviewed    Patient Progress  Patient progress: stable    Patient is advised that with this many days off of alcohol he should not be having withdrawal symptoms.  He is very anxious about his current situation.  He wants us to get him a place to stay at the [facility] but it is too late in the evening for them to accept any more people.  He is not having chest pain at this time and was cleared cardiac discharge.  He does not have a plan for rehab.  He is given information on facilities contact.  He is alert and oriented and understands the situation and plan.  He is discharged.    Final diagnoses:   Anxiety   Alcoholism (CMS/HCC)       ED Disposition  ED Disposition     ED Disposition Condition Comment    [facility]               Medication  List      No changes were made to your prescriptions during this visit.          Ricardo Gardner,   08/13/21 [id number]

## 2021-08-13 NOTE — OUTREACH NOTE
Call Center TCM Note      Responses   [facility] patient discharged from?  [place]   Does the patient have one of the following disease processes/diagnoses(primary or secondary)?  Other   TCM attempt successful?  No   Unsuccessful attempts  Attempt 1          Vandana Starr RN    8/13/2021, 09:28 EDT

## 2021-08-16 ENCOUNTER — TRANSITIONAL CARE MANAGEMENT TELEPHONE ENCOUNTER (OUTPATIENT)
Dept: CALL CENTER | Facility: HOSPITAL | Age: 51
End: 2021-08-16

## 2021-08-16 NOTE — OUTREACH NOTE
Call Center TCM Note      Responses   [facility] patient discharged from?  [place]   Does the patient have one of the following disease processes/diagnoses(primary or secondary)?  Other   TCM attempt successful?  No   Unsuccessful attempts  Attempt 3          Bonnie Zambrano LPN    8/16/2021, 15:42 EDT

## 2021-08-30 LAB
QT INTERVAL: 380 MS
QTC INTERVAL: 507 MS